# Patient Record
Sex: FEMALE | Race: WHITE | Employment: PART TIME | URBAN - NONMETROPOLITAN AREA
[De-identification: names, ages, dates, MRNs, and addresses within clinical notes are randomized per-mention and may not be internally consistent; named-entity substitution may affect disease eponyms.]

---
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
## 2022-02-16 ENCOUNTER — HOSPITAL ENCOUNTER (OUTPATIENT)
Age: 22
Discharge: HOME | End: 2022-02-16
Payer: COMMERCIAL

## 2022-02-16 ENCOUNTER — HOSPITAL ENCOUNTER (OUTPATIENT)
Dept: GENERAL RADIOLOGY | Age: 22
Discharge: HOME | End: 2022-02-16
Payer: COMMERCIAL

## 2022-02-16 DIAGNOSIS — R07.9 CHEST PAIN, UNSPECIFIED TYPE: ICD-10-CM

## 2022-02-16 PROCEDURE — 71046 X-RAY EXAM CHEST 2 VIEWS: CPT

## 2022-06-07 ENCOUNTER — HOSPITAL ENCOUNTER (OUTPATIENT)
Age: 22
Discharge: HOME | End: 2022-06-07

## 2022-06-22 ENCOUNTER — HOSPITAL ENCOUNTER (EMERGENCY)
Age: 22
Discharge: HOME | End: 2022-06-22
Attending: EMERGENCY MEDICINE
Payer: COMMERCIAL

## 2022-06-22 ENCOUNTER — APPOINTMENT (OUTPATIENT)
Dept: GENERAL RADIOLOGY | Age: 22
End: 2022-06-22
Payer: COMMERCIAL

## 2022-06-22 VITALS
WEIGHT: 131 LBS | BODY MASS INDEX: 22.36 KG/M2 | OXYGEN SATURATION: 99 % | HEIGHT: 64 IN | DIASTOLIC BLOOD PRESSURE: 83 MMHG | TEMPERATURE: 98.3 F | RESPIRATION RATE: 12 BRPM | SYSTOLIC BLOOD PRESSURE: 129 MMHG | HEART RATE: 71 BPM

## 2022-06-22 DIAGNOSIS — R07.9 ACUTE CHEST PAIN: Primary | ICD-10-CM

## 2022-06-22 LAB
ALBUMIN SERPL-MCNC: 4.6 G/DL (ref 3.5–5.1)
ALP BLD-CCNC: 103 U/L (ref 38–126)
ALT SERPL-CCNC: 7 U/L (ref 11–66)
ANION GAP SERPL CALCULATED.3IONS-SCNC: 12 MEQ/L (ref 8–16)
AST SERPL-CCNC: 16 U/L (ref 5–40)
BASOPHILS # BLD: 0.5 %
BASOPHILS ABSOLUTE: 0 THOU/MM3 (ref 0–0.1)
BILIRUB SERPL-MCNC: 0.3 MG/DL (ref 0.3–1.2)
BUN BLDV-MCNC: 11 MG/DL (ref 7–22)
CALCIUM SERPL-MCNC: 9.7 MG/DL (ref 8.5–10.5)
CHLORIDE BLD-SCNC: 106 MEQ/L (ref 98–111)
CO2: 23 MEQ/L (ref 23–33)
CREAT SERPL-MCNC: 0.6 MG/DL (ref 0.4–1.2)
D-DIMER QUANTITATIVE: 403 NG/ML FEU (ref 0–500)
EKG ATRIAL RATE: 84 BPM
EKG P AXIS: 66 DEGREES
EKG P-R INTERVAL: 124 MS
EKG Q-T INTERVAL: 360 MS
EKG QRS DURATION: 68 MS
EKG QTC CALCULATION (BAZETT): 425 MS
EKG R AXIS: 68 DEGREES
EKG T AXIS: 34 DEGREES
EKG VENTRICULAR RATE: 84 BPM
EOSINOPHIL # BLD: 2.7 %
EOSINOPHILS ABSOLUTE: 0.2 THOU/MM3 (ref 0–0.4)
ERYTHROCYTE [DISTWIDTH] IN BLOOD BY AUTOMATED COUNT: 11.9 % (ref 11.5–14.5)
ERYTHROCYTE [DISTWIDTH] IN BLOOD BY AUTOMATED COUNT: 38 FL (ref 35–45)
GLUCOSE BLD-MCNC: 90 MG/DL (ref 70–108)
HCT VFR BLD CALC: 38.6 % (ref 37–47)
HEMOGLOBIN: 13.2 GM/DL (ref 12–16)
IMMATURE GRANS (ABS): 0.02 THOU/MM3 (ref 0–0.07)
IMMATURE GRANULOCYTES: 0.3 %
LYMPHOCYTES # BLD: 27 %
LYMPHOCYTES ABSOLUTE: 1.6 THOU/MM3 (ref 1–4.8)
MCH RBC QN AUTO: 30.3 PG (ref 26–33)
MCHC RBC AUTO-ENTMCNC: 34.2 GM/DL (ref 32.2–35.5)
MCV RBC AUTO: 88.5 FL (ref 81–99)
MONOCYTES # BLD: 8.5 %
MONOCYTES ABSOLUTE: 0.5 THOU/MM3 (ref 0.4–1.3)
NUCLEATED RED BLOOD CELLS: 0 /100 WBC
OSMOLALITY CALCULATION: 280.2 MOSMOL/KG (ref 275–300)
PLATELET # BLD: 135 THOU/MM3 (ref 130–400)
PMV BLD AUTO: 12.9 FL (ref 9.4–12.4)
POTASSIUM REFLEX MAGNESIUM: 4.4 MEQ/L (ref 3.5–5.2)
PREGNANCY, SERUM: NEGATIVE
RBC # BLD: 4.36 MILL/MM3 (ref 4.2–5.4)
SEG NEUTROPHILS: 61 %
SEGMENTED NEUTROPHILS ABSOLUTE COUNT: 3.7 THOU/MM3 (ref 1.8–7.7)
SODIUM BLD-SCNC: 141 MEQ/L (ref 135–145)
TOTAL PROTEIN: 6.8 G/DL (ref 6.1–8)
TROPONIN T: < 0.01 NG/ML
WBC # BLD: 6 THOU/MM3 (ref 4.8–10.8)

## 2022-06-22 PROCEDURE — 84703 CHORIONIC GONADOTROPIN ASSAY: CPT

## 2022-06-22 PROCEDURE — 85379 FIBRIN DEGRADATION QUANT: CPT

## 2022-06-22 PROCEDURE — 36415 COLL VENOUS BLD VENIPUNCTURE: CPT

## 2022-06-22 PROCEDURE — 84484 ASSAY OF TROPONIN QUANT: CPT

## 2022-06-22 PROCEDURE — 6370000000 HC RX 637 (ALT 250 FOR IP)

## 2022-06-22 PROCEDURE — 85025 COMPLETE CBC W/AUTO DIFF WBC: CPT

## 2022-06-22 PROCEDURE — 99215 OFFICE O/P EST HI 40 MIN: CPT

## 2022-06-22 PROCEDURE — 99203 OFFICE O/P NEW LOW 30 MIN: CPT | Performed by: EMERGENCY MEDICINE

## 2022-06-22 PROCEDURE — 71045 X-RAY EXAM CHEST 1 VIEW: CPT

## 2022-06-22 PROCEDURE — 93010 ELECTROCARDIOGRAM REPORT: CPT | Performed by: NUCLEAR MEDICINE

## 2022-06-22 PROCEDURE — 80053 COMPREHEN METABOLIC PANEL: CPT

## 2022-06-22 PROCEDURE — 93005 ELECTROCARDIOGRAM TRACING: CPT | Performed by: EMERGENCY MEDICINE

## 2022-06-22 PROCEDURE — 99284 EMERGENCY DEPT VISIT MOD MDM: CPT

## 2022-06-22 RX ORDER — ASPIRIN 81 MG/1
324 TABLET, CHEWABLE ORAL ONCE
Status: COMPLETED | OUTPATIENT
Start: 2022-06-22 | End: 2022-06-22

## 2022-06-22 RX ORDER — ASPIRIN 81 MG/1
TABLET, CHEWABLE ORAL
Status: DISCONTINUED
Start: 2022-06-22 | End: 2022-06-22

## 2022-06-22 RX ADMIN — ASPIRIN 324 MG: 81 TABLET, CHEWABLE ORAL at 13:09

## 2022-06-22 RX ADMIN — ASPIRIN 81 MG 324 MG: 81 TABLET ORAL at 13:09

## 2022-06-22 ASSESSMENT — ENCOUNTER SYMPTOMS
EYE PAIN: 0
ABDOMINAL PAIN: 0
SHORTNESS OF BREATH: 1
NAUSEA: 0
CONSTIPATION: 0
SINUS PRESSURE: 0
EYE DISCHARGE: 0
CHOKING: 0
ROS SKIN COMMENTS: NO RASH OR BRUISING
SORE THROAT: 0
VOICE CHANGE: 0
STRIDOR: 0
WHEEZING: 0
TROUBLE SWALLOWING: 0
DIARRHEA: 0
VOMITING: 0
BACK PAIN: 0
FACIAL SWELLING: 0
EYE REDNESS: 0
BLOOD IN STOOL: 0
COUGH: 0

## 2022-06-22 ASSESSMENT — PAIN SCALES - GENERAL
PAINLEVEL_OUTOF10: 4
PAINLEVEL_OUTOF10: 5
PAINLEVEL_OUTOF10: 4
PAINLEVEL_OUTOF10: 4

## 2022-06-22 ASSESSMENT — PAIN DESCRIPTION - LOCATION
LOCATION: CHEST

## 2022-06-22 ASSESSMENT — PAIN - FUNCTIONAL ASSESSMENT
PAIN_FUNCTIONAL_ASSESSMENT: 0-10
PAIN_FUNCTIONAL_ASSESSMENT: PREVENTS OR INTERFERES SOME ACTIVE ACTIVITIES AND ADLS
PAIN_FUNCTIONAL_ASSESSMENT: NONE - DENIES PAIN

## 2022-06-22 ASSESSMENT — PAIN DESCRIPTION - DESCRIPTORS: DESCRIPTORS: ACHING

## 2022-06-22 ASSESSMENT — PAIN DESCRIPTION - FREQUENCY: FREQUENCY: CONTINUOUS

## 2022-06-22 ASSESSMENT — PAIN DESCRIPTION - ORIENTATION: ORIENTATION: MID;UPPER

## 2022-06-22 ASSESSMENT — HEART SCORE: ECG: 0

## 2022-06-22 NOTE — ED NOTES
Patient resting in bed. Family at bedside. Respirations easy and unlabored. No distress noted. Call light within reach.        Ginger Mcgowan RN  06/22/22 3798

## 2022-06-22 NOTE — Clinical Note
Junaid Monroe was seen and treated in our emergency department on 6/22/2022. She may return to work on 06/23/2022. If you have any questions or concerns, please don't hesitate to call.       Fernando Carlson, DO

## 2022-06-22 NOTE — ED PROVIDER NOTES
[address] [facility] [address] [address] EMERGENCY DEPT  [facility]       Chief Complaint   Patient presents with    Chest Pain     shortness of breath       Nurses Notes reviewed and I agree except as noted in the HPI. HISTORY OF PRESENT ILLNESS   Jaycee Schofield is a 25 y.o. female who presents with 24-hour history of substernal chest discomfort that she rates at 5 out of 10 in severity, associated with shortness of breath and dizziness. No cough, fever, vomiting, abdominal pain, dizziness, syncope, diaphoresis, rash,  symptoms. No history of heart disease, asthma, DVT/PE. Non-smoker  No possibility of pregnancy  REVIEW OF SYSTEMS     Review of Systems   Constitutional: Negative for appetite change, chills, fatigue, fever and unexpected weight change. Normal appetite no fever   HENT: Negative for congestion, ear discharge, ear pain, facial swelling, hearing loss, nosebleeds, postnasal drip, sinus pressure, sore throat, trouble swallowing and voice change. No upper respiratory symptoms   Eyes: Negative for pain, discharge, redness and visual disturbance. No redness or drainage   Respiratory: Positive for shortness of breath. Negative for cough, choking, wheezing and stridor. Shortness of breath, no cough   Cardiovascular: Positive for chest pain. Negative for leg swelling. Substernal chest pain no syncope   Gastrointestinal: Negative for abdominal pain, blood in stool, constipation, diarrhea, nausea and vomiting. No abdominal pain or vomit   Genitourinary: Negative for dysuria, flank pain, frequency, hematuria, urgency, vaginal bleeding and vaginal discharge. No possibility of pregnancy   Musculoskeletal: Negative for arthralgias, back pain, neck pain and neck stiffness. No leg pain or swelling   Skin: Negative for rash. No rash or bruising   Neurological: Positive for dizziness.  Negative for seizures, syncope, weakness, light-headedness and headaches. No headache or lethargy   Hematological: Negative for adenopathy. Does not bruise/bleed easily. Psychiatric/Behavioral: Negative for confusion, sleep disturbance and suicidal ideas. The patient is not nervous/anxious. red and bold elements reviewed    PAST MEDICAL HISTORY   History reviewed. No pertinent past medical history. SURGICAL HISTORY     Patient  has no past surgical history on file. CURRENT MEDICATIONS       Previous Medications    ASPIRIN-ACETAMINOPHEN-CAFFEINE (EXCEDRIN MIGRAINE) 250-250-65 MG PER TABLET    Take 2 tablets by mouth every 6 hours as needed for Headaches       ALLERGIES     Patient is has No Known Allergies. FAMILY HISTORY     Patient'sfamily history includes Asthma in her father; Heart Disease in her mother; High Blood Pressure in her maternal grandfather and maternal grandmother; Other in her father and paternal grandfather. SOCIAL HISTORY     Patient  reports that she has never smoked. She has never used smokeless tobacco. She reports current alcohol use. She reports that she does not use drugs. PHYSICAL EXAM     ED TRIAGE VITALS  BP: 123/78, Temp: 98.3 °F (36.8 °C), Heart Rate: 92, Resp: 18, SpO2: 98 %  Physical Exam  Vitals and nursing note reviewed. Constitutional:       General: She is not in acute distress. Appearance: She is well-developed. She is not ill-appearing. Comments: Moist membranes   HENT:      Head: Normocephalic and atraumatic. Right Ear: External ear normal.      Left Ear: External ear normal.      Nose: Nose normal.      Mouth/Throat:      Pharynx: No oropharyngeal exudate. Comments: Oropharynx normal  Eyes:      General: No scleral icterus. Right eye: No discharge. Left eye: No discharge. Extraocular Movements:      Right eye: Normal extraocular motion. Left eye: Normal extraocular motion.       Conjunctiva/sclera: Conjunctivae normal.      Pupils: Pupils are equal, round, and reactive to light. Comments: Conjunctiva clear   Neck:      Thyroid: No thyromegaly. Vascular: No JVD. Comments: No meningismus  Cardiovascular:      Rate and Rhythm: Normal rate and regular rhythm. Pulses: Normal pulses. Heart sounds: Normal heart sounds, S1 normal and S2 normal. No murmur heard. No friction rub. No gallop. Comments: Normal sinus rhythm no murmur  Pulmonary:      Effort: Pulmonary effort is normal. No tachypnea or respiratory distress. Breath sounds: Normal breath sounds. No stridor. No decreased breath sounds, wheezing, rhonchi or rales. Comments: No cough lungs clear  Chest:      Chest wall: No tenderness. Abdominal:      General: Bowel sounds are normal. There is no distension. Palpations: Abdomen is soft. There is no mass. Tenderness: There is no abdominal tenderness. There is no guarding or rebound. Musculoskeletal:         General: No tenderness. Normal range of motion. Cervical back: Normal range of motion. Comments: Extremities normal   Lymphadenopathy:      Cervical: No cervical adenopathy. Right cervical: No superficial cervical adenopathy. Left cervical: No superficial cervical adenopathy. Skin:     General: Skin is warm and dry. Findings: No erythema or rash. Comments: No rash or bruising on examined areas   Neurological:      Mental Status: She is alert and oriented to person, place, and time. Cranial Nerves: No cranial nerve deficit. Motor: No abnormal muscle tone. Coordination: Coordination normal.      Deep Tendon Reflexes: Reflexes are normal and symmetric. Reflexes normal.      Comments: Appropriate no focal finding   Psychiatric:         Behavior: Behavior normal.         Thought Content: Thought content normal.         Judgment: Judgment normal.         DIAGNOSTIC RESULTS   Labs: No results found for this visit on 06/22/22.   EKG- normal sinus rhythm, no myocardial ischemia or infarction per my interpretation                     IMAGING:  No orders to display     URGENT CARE COURSE:     Vitals:    06/22/22 1305   BP: 123/78   Pulse: 92   Resp: 18   Temp: 98.3 °F (36.8 °C)   TempSrc: Temporal   SpO2: 98%   Weight: 131 lb (59.4 kg)       Medications   aspirin chewable tablet 324 mg (324 mg Oral Given 6/22/22 1309)   Tolerated well  PROCEDURES:  None  FINALIMPRESSION      1. Acute chest pain    2. Shortness of breath        DISPOSITION/PLAN   DISPOSITION Decision To Transfer 06/22/2022 01:14:30 PM  Patient transferred to [facility] ED per her request.  She is stable for private vehicle transfer with friend to drive.   Patient accepted in transfer by Sterling Siegel [facility] ED charge nurse at [address]:  to [facility] ED      to [facility] ED    DISCHARGE MEDICATIONS:  New Prescriptions    No medications on file     Current Discharge Medication List          MD Tawny Anthony MD  06/22/22 6947

## 2022-06-22 NOTE — ED TRIAGE NOTES
Patient to room from registration. C/o chest pain and shortness of breath beginning 24 hours ago. EKG completed. Provider notified.

## 2022-06-22 NOTE — Clinical Note
Francoise Ashley was seen and treated in our emergency department on 6/22/2022. She may return to work on 06/23/2022. If you have any questions or concerns, please don't hesitate to call.       [facility], DO

## 2022-06-22 NOTE — ED NOTES
Patient released in stable condition. Instructed to go directly to [facility] emergency department for further evaluation and treatment. Instructed to remain NPO until seen by emergency room provider. Patient verbalized understanding. Ambulated, per self, to private car.       [address] [facility], RN  06/22/22 9779

## 2022-06-22 NOTE — ED NOTES
Patient resting in bed. Respirations easy and unlabored. No distress noted. Call light within reach.        Gale Perez RN  06/22/22 [id number]

## 2022-06-22 NOTE — ED PROVIDER NOTES
[facility] [address] COMPLAINT       Chief Complaint   Patient presents with    Chest Pain       Nurses Notes reviewed and I agree except as noted in the HPI. HISTORY OF PRESENT ILLNESS    Diamante Nguyen is a 25 y.o. female. Patient was initially seen at the urgent care and sent over with chest pain beginning earlier in the day in the center of the chest without radiation. Has not had vomiting or sweating. Has not reportedly had similar symptoms in the past.  Patient came with EKG from the urgent care showing sinus rhythm at rate of 84  with no ischemic changes. REVIEW OF SYSTEMS         No fever, no coughing, no extremity pain    Remainder of review of systems is otherwise reviewed as negative. PAST MEDICAL HISTORY    has no past medical history on file. SURGICAL HISTORY      has no past surgical history on file. CURRENT MEDICATIONS       Previous Medications    ASPIRIN-ACETAMINOPHEN-CAFFEINE (EXCEDRIN MIGRAINE) 250-250-65 MG PER TABLET    Take 2 tablets by mouth every 6 hours as needed for Headaches       ALLERGIES     has No Known Allergies. FAMILY HISTORY     She indicated that her mother is alive. She indicated that her father is . She indicated that her maternal grandmother is alive. She indicated that her maternal grandfather is alive. She indicated that her paternal grandmother is alive. She indicated that her paternal grandfather is . family history includes Asthma in her father; Heart Disease in her mother; High Blood Pressure in her maternal grandfather and maternal grandmother; Other in her father and paternal grandfather. SOCIAL HISTORY      reports that she has never smoked. She has never used smokeless tobacco. She reports current alcohol use. She reports that she does not use drugs. PHYSICAL EXAM     INITIAL VITALS:  height is 5' 4\" (1.626 m) and weight is 131 lb (59.4 kg).  Her temporal temperature is 98.3 °F (36.8 °C). Her blood pressure is 129/83 and her pulse is 71. Her respiration is 12 and oxygen saturation is 99%. Constitutional: Well appearing and non-toxic   Eyes:  Pupils are equal and reactive, extraocular muscles intact   HENT:  Atraumatic appearing  oropharynx moist, no pharyngeal exudates. Neck- normal range of motion,  supple   Respiratory:  No wheezing, rhonchi or rales  Cardiovascular: regular  GI:  Non tender, no rigidity, rebound or guarding  Musculoskeletal:  2/4 distal pulses, no pitting edema  Integument: warm and dry  Neurologic:  Alert & oriented x 3  Psychiatric:  Speech and behavior appropriate      DIAGNOSTIC RESULTS     EKG: All EKG's are interpreted by the Emergency Department Physician who either signs or Co-signs this chart in the absence of a cardiologist.  EKG was obtained at the urgent care and is in our system. Showing sinus rhythm at a rate of 84, QRS of 68, QTc of 4-25, axis of 68 with no ST elevation or depression seen. This is a normal-appearing EKG.     RADIOLOGY: non-plain film images(s) such as CT, Ultrasound and MRI are read by the radiologist.  Chest x-ray was interpreted by the radiologist as negative    LABS:   Labs Reviewed   CBC WITH AUTO DIFFERENTIAL - Abnormal; Notable for the following components:       Result Value    MPV 12.9 (*)     All other components within normal limits   COMPREHENSIVE METABOLIC PANEL W/ REFLEX TO MG FOR LOW K - Abnormal; Notable for the following components:    ALT 7 (*)     All other components within normal limits   D-DIMER, QUANTITATIVE   HCG, SERUM, QUALITATIVE   TROPONIN   ANION GAP   OSMOLALITY       EMERGENCY DEPARTMENT COURSE:   Vitals:    Vitals:    06/22/22 1305 06/22/22 1338 06/22/22 1458 06/22/22 1622   BP: 123/78 135/82 122/72 129/83   Pulse: 92 99 70 71   Resp: 18 17 15 12   Temp: 98.3 °F (36.8 °C)      TempSrc: Temporal      SpO2: 98% 99% 99% 99%   Weight: 131 lb (59.4 kg) 131 lb (59.4 kg)     Height: 5' 4\" (1.626 m)       Patient is ruled out for pulmonary embolism by negative D-dimer. She is at low risk. Patient has no history of hypertension hyperlipidemia or diabetes, does not have any strong family history of coronary artery disease. Therefore has a low heart score. There is no evidence of pneumothorax or pneumonia on the chest x-ray. No obvious catastrophic process. Neg HCG. She is advised to follow-up in 3 to 5 days. Heart Score     Score of 0    Heart Score for chest pain patients  History: Slightly Suspicious  ECG: Normal  Patient Age: < 45 years  Risk Factors: No risk factors known    HEART Score recommendations:  Score 0 - 3:   <1.7%  risk of MACE over next 6 wks = Outpatient workup  Score 4 - 6: 12-16% risk of MACE over next 6 wks = Admission for observation  Score 7- 10: 50-65% risk of MACE over next 6 wks = Early invasive strategy    MACE: Major Acute Cardiac Event (All Cause Mortality, AMI or revascularization)  Chest Pain in the Emergency Room: A Multicenter Validation of the [address] Joaquin BE, José AJ, Arsen NADEEM, Edie TP, [employer], Edie EG, Elvia SH, [facility]. Crit Pathw Cardiol. 2010 Sep; 9(3): 164-169. \"A prospective validation of the HEART score for chest pain patients at the emergency department. \" Int J Cardiol. 2013 Oct 3;168(3):2153-8. doi: 10.1016/j.ijcard. 2013.01.255. Epub 2013 Mar 7. CRITICAL CARE:   none         FINAL IMPRESSION      1.  Acute chest pain          DISPOSITION/PLAN   discharged    DISCHARGE MEDICATIONS:  New Prescriptions    No medications on file       (Please note that portions of this note were completed with a voice recognition program.  Efforts were made to edit the dictations but occasionally words are mis-transcribed.)    [facility], [address], DO  06/22/22 6596

## 2022-11-11 ENCOUNTER — HOSPITAL ENCOUNTER (OUTPATIENT)
Age: 22
Discharge: HOME | End: 2022-11-11
Payer: COMMERCIAL

## 2022-11-11 LAB
ALBUMIN SERPL-MCNC: 5 G/DL (ref 3.5–5.1)
ALP BLD-CCNC: 90 U/L (ref 38–126)
ALT SERPL-CCNC: 28 U/L (ref 11–66)
ANION GAP SERPL CALCULATED.3IONS-SCNC: 14 MEQ/L (ref 8–16)
AST SERPL-CCNC: 28 U/L (ref 5–40)
BASOPHILS # BLD: 0.4 %
BASOPHILS ABSOLUTE: 0 THOU/MM3 (ref 0–0.1)
BILIRUB SERPL-MCNC: 0.5 MG/DL (ref 0.3–1.2)
BUN BLDV-MCNC: 11 MG/DL (ref 7–22)
CALCIUM SERPL-MCNC: 10 MG/DL (ref 8.5–10.5)
CHLORIDE BLD-SCNC: 105 MEQ/L (ref 98–111)
CO2: 23 MEQ/L (ref 23–33)
CREAT SERPL-MCNC: 0.7 MG/DL (ref 0.4–1.2)
EKG ATRIAL RATE: 65 BPM
EKG P AXIS: 66 DEGREES
EKG P-R INTERVAL: 126 MS
EKG Q-T INTERVAL: 390 MS
EKG QRS DURATION: 68 MS
EKG QTC CALCULATION (BAZETT): 405 MS
EKG R AXIS: 68 DEGREES
EKG T AXIS: 43 DEGREES
EKG VENTRICULAR RATE: 65 BPM
EOSINOPHIL # BLD: 2.6 %
EOSINOPHILS ABSOLUTE: 0.1 THOU/MM3 (ref 0–0.4)
ERYTHROCYTE [DISTWIDTH] IN BLOOD BY AUTOMATED COUNT: 12.4 % (ref 11.5–14.5)
ERYTHROCYTE [DISTWIDTH] IN BLOOD BY AUTOMATED COUNT: 40.1 FL (ref 35–45)
GFR SERPL CREATININE-BSD FRML MDRD: > 60 ML/MIN/1.73M2
GLUCOSE BLD-MCNC: 95 MG/DL (ref 70–108)
HCT VFR BLD CALC: 40.8 % (ref 37–47)
HEMOGLOBIN: 13.9 GM/DL (ref 12–16)
IMMATURE GRANS (ABS): 0.01 THOU/MM3 (ref 0–0.07)
IMMATURE GRANULOCYTES: 0.2 %
LYMPHOCYTES # BLD: 34.4 %
LYMPHOCYTES ABSOLUTE: 1.6 THOU/MM3 (ref 1–4.8)
MCH RBC QN AUTO: 30.3 PG (ref 26–33)
MCHC RBC AUTO-ENTMCNC: 34.1 GM/DL (ref 32.2–35.5)
MCV RBC AUTO: 88.9 FL (ref 81–99)
MONOCYTES # BLD: 6.5 %
MONOCYTES ABSOLUTE: 0.3 THOU/MM3 (ref 0.4–1.3)
NUCLEATED RED BLOOD CELLS: 0 /100 WBC
PLATELET # BLD: 138 THOU/MM3 (ref 130–400)
PMV BLD AUTO: 12.8 FL (ref 9.4–12.4)
POTASSIUM SERPL-SCNC: 4.4 MEQ/L (ref 3.5–5.2)
RBC # BLD: 4.59 MILL/MM3 (ref 4.2–5.4)
RHEUMATOID FACTOR: < 10 IU/ML (ref 0–13)
SEG NEUTROPHILS: 55.9 %
SEGMENTED NEUTROPHILS ABSOLUTE COUNT: 2.6 THOU/MM3 (ref 1.8–7.7)
SODIUM BLD-SCNC: 142 MEQ/L (ref 135–145)
T4 FREE: 1.09 NG/DL (ref 0.93–1.76)
TOTAL PROTEIN: 7.5 G/DL (ref 6.1–8)
TSH SERPL DL<=0.05 MIU/L-ACNC: 0.89 UIU/ML (ref 0.4–4.2)
WBC # BLD: 4.7 THOU/MM3 (ref 4.8–10.8)

## 2022-11-11 PROCEDURE — 80053 COMPREHEN METABOLIC PANEL: CPT

## 2022-11-11 PROCEDURE — 84443 ASSAY THYROID STIM HORMONE: CPT

## 2022-11-11 PROCEDURE — 93005 ELECTROCARDIOGRAM TRACING: CPT | Performed by: PEDIATRICS

## 2022-11-11 PROCEDURE — 86430 RHEUMATOID FACTOR TEST QUAL: CPT

## 2022-11-11 PROCEDURE — 85025 COMPLETE CBC W/AUTO DIFF WBC: CPT

## 2022-11-11 PROCEDURE — 93010 ELECTROCARDIOGRAM REPORT: CPT | Performed by: NUCLEAR MEDICINE

## 2022-11-11 PROCEDURE — 36415 COLL VENOUS BLD VENIPUNCTURE: CPT

## 2022-11-11 PROCEDURE — 84439 ASSAY OF FREE THYROXINE: CPT

## 2022-11-28 ENCOUNTER — TELEPHONE (OUTPATIENT)
Dept: CARDIOLOGY CLINIC | Age: 22
End: 2022-11-28

## 2022-12-05 ENCOUNTER — OFFICE VISIT (OUTPATIENT)
Dept: CARDIOLOGY CLINIC | Age: 22
End: 2022-12-05
Payer: COMMERCIAL

## 2022-12-05 VITALS
HEART RATE: 88 BPM | HEIGHT: 64 IN | SYSTOLIC BLOOD PRESSURE: 138 MMHG | DIASTOLIC BLOOD PRESSURE: 72 MMHG | BODY MASS INDEX: 22.5 KG/M2 | WEIGHT: 131.8 LBS

## 2022-12-05 DIAGNOSIS — R42 DIZZINESS: Primary | ICD-10-CM

## 2022-12-05 DIAGNOSIS — R55 SYNCOPE, UNSPECIFIED SYNCOPE TYPE: ICD-10-CM

## 2022-12-05 PROCEDURE — 99204 OFFICE O/P NEW MOD 45 MIN: CPT | Performed by: NUCLEAR MEDICINE

## 2022-12-05 RX ORDER — SERTRALINE HYDROCHLORIDE 100 MG/1
TABLET, FILM COATED ORAL
COMMUNITY
Start: 2022-11-10

## 2022-12-05 RX ORDER — MEDROXYPROGESTERONE ACETATE 150 MG/ML
150 INJECTION, SUSPENSION INTRAMUSCULAR
COMMUNITY

## 2022-12-05 ASSESSMENT — ENCOUNTER SYMPTOMS
CONSTIPATION: 0
CHEST TIGHTNESS: 0
NAUSEA: 0
BACK PAIN: 0
ANAL BLEEDING: 0
SHORTNESS OF BREATH: 0
DIARRHEA: 0
RECTAL PAIN: 0
ABDOMINAL PAIN: 0
ABDOMINAL DISTENTION: 0
COLOR CHANGE: 0
VOMITING: 0
BLOOD IN STOOL: 0

## 2022-12-05 NOTE — PROGRESS NOTES
New patient appointment. Patient complains of episodes of almost passing out. Patient complains of dizziness, lightheaded, blurred vision and weakness.

## 2022-12-05 NOTE — PROGRESS NOTES
[address] [place] [address]  [place] [address]  Dept: [phone]  Dept Fax: [phone]  Loc: [phone]    Visit Date: 12/5/2022    Cayden García is a 23 y.o. female who presents todayfor:  Chief Complaint   Patient presents with    New Patient    Establish Cardiologist    Dizziness     Here for the first time  Intermittent dizziness  Near syncope  Different positions  Could be laying but less often   Usually lasts several minutes  No complete syncope  Healthy other wise  Some palpitation   No arrhythmia  No known health issues  No drugs   Low caffeine  No smoking  Family history of CAD     HPI:  Dizziness  Pertinent negatives include no abdominal pain, arthralgias, chest pain, fatigue, joint swelling, myalgias, nausea, neck pain, rash or vomiting. History reviewed. No pertinent past medical history. No past surgical history on file. Family History   Problem Relation Age of Onset    Heart Disease Mother         a fib    Asthma Father     Other Father         MVA    High Blood Pressure Maternal Grandmother     High Blood Pressure Maternal Grandfather     Other Paternal Grandfather         Hep C     Social History     Tobacco Use    Smoking status: Never    Smokeless tobacco: Never   Substance Use Topics    Alcohol use: Yes     Comment: rarely      Current Outpatient Medications   Medication Sig Dispense Refill    sertraline (ZOLOFT) 100 MG tablet TAKE 1 TABLET BY MOUTH EVERY DAY FOR 30 DAYS      medroxyPROGESTERone (DEPO-PROVERA) 150 MG/ML injection Inject 150 mg into the muscle every 3 months       No current facility-administered medications for this visit.      Allergies   Allergen Reactions    Amoxicillin Rash     Health Maintenance   Topic Date Due    COVID-19 Vaccine (1) Never done    Depression Screen  Never done    HIV screen  Never done    Chlamydia/GC screen  Never done    Hepatitis C screen  Never done    Hepatitis A vaccine (2 of 2 - 2-dose series) 05/12/2022    Flu vaccine (1) Never done    DTaP/Tdap/Td vaccine (5 - Td or Tdap) 08/19/2025    Hib vaccine  Completed    HPV vaccine  Completed    Varicella vaccine  Completed    Meningococcal (ACWY) vaccine  Completed    Pneumococcal 0-64 years Vaccine  Aged Out       Subjective:  Review of Systems   Constitutional:  Negative for fatigue. HENT:  Negative for ear pain and mouth sores. Respiratory:  Negative for chest tightness and shortness of breath. Cardiovascular:  Negative for chest pain and palpitations. Gastrointestinal:  Negative for abdominal distention, abdominal pain, anal bleeding, blood in stool, constipation, diarrhea, nausea, rectal pain and vomiting. Endocrine: Negative for polyphagia. Genitourinary:  Negative for dysuria, hematuria and urgency. Musculoskeletal:  Negative for arthralgias, back pain, gait problem, joint swelling, myalgias, neck pain and neck stiffness. Skin:  Negative for color change, pallor, rash and wound. Allergic/Immunologic: Negative for food allergies. Neurological:  Positive for dizziness and light-headedness. Negative for syncope. Psychiatric/Behavioral:  Negative for behavioral problems, confusion, decreased concentration and dysphoric mood. Objective:  Physical Exam  HENT:      Head: Normocephalic. Right Ear: Tympanic membrane normal.      Nose: Nose normal.      Mouth/Throat:      Mouth: Mucous membranes are moist.   Eyes:      Pupils: Pupils are equal, round, and reactive to light. Cardiovascular:      Rate and Rhythm: Normal rate and regular rhythm. Heart sounds: Murmur heard. No gallop. Pulmonary:      Effort: No respiratory distress. Breath sounds: No stridor. No wheezing, rhonchi or rales. Chest:      Chest wall: No tenderness. Abdominal:      General: There is no distension. Palpations: There is no mass. Tenderness: There is no abdominal tenderness.  There is no right CVA tenderness, left CVA tenderness, guarding or rebound. Hernia: No hernia is present. Musculoskeletal:         General: No swelling, tenderness, deformity or signs of injury. Cervical back: Normal range of motion. Right lower leg: No edema. Left lower leg: No edema. Skin:     Coloration: Skin is not jaundiced or pale. Findings: No bruising, erythema, lesion or rash. Neurological:      Mental Status: She is alert and oriented to person, place, and time. Cranial Nerves: No cranial nerve deficit. Sensory: No sensory deficit. Motor: No weakness. Coordination: Coordination normal.      Gait: Gait normal.      Deep Tendon Reflexes: Reflexes normal.   Psychiatric:         Mood and Affect: Mood normal.         Thought Content: Thought content normal.     /72   Pulse 88   Ht 5' 4\" (1.626 m)   Wt 131 lb 12.8 oz (59.8 kg)   BMI 22.62 kg/m²     Assessment:      Diagnosis Orders   1. Dizziness        Possible BP related ? ?vagal   Cant exclude other causes  Normal ECG     Plan:  No follow-ups on file. Discussed  Tilt table   Holter  Continue risk factor modification and medical management  Thank you for allowing me to participate in the care of your patient. Please don't hesitate to contact me regarding any further issues related to the patient care    Orders Placed:  No orders of the defined types were placed in this encounter. Medications Prescribed:  No orders of the defined types were placed in this encounter. Discussed use, benefit, and side effects of prescribed medications. All patient questions answered. Pt voicedunderstanding. Instructed to continue current medications, diet and exercise. Continue risk factor modification and medical management. Patient agreed with treatment plan. Follow up as directed.     Electronically signedby Raul Stewart MD on 12/5/2022 at 9:09 AM

## 2022-12-16 ENCOUNTER — HOSPITAL ENCOUNTER (OUTPATIENT)
Dept: NON INVASIVE DIAGNOSTICS | Age: 22
Discharge: HOME | End: 2022-12-16
Payer: COMMERCIAL

## 2022-12-16 DIAGNOSIS — R55 SYNCOPE, UNSPECIFIED SYNCOPE TYPE: ICD-10-CM

## 2022-12-16 DIAGNOSIS — R42 DIZZINESS: ICD-10-CM

## 2022-12-16 PROCEDURE — 93225 XTRNL ECG REC<48 HRS REC: CPT

## 2022-12-16 PROCEDURE — 93226 XTRNL ECG REC<48 HR SCAN A/R: CPT

## 2022-12-16 NOTE — PROCEDURES
The skin was prepped and a 24 hour monitor was applied. The patient was instructed on the documentation of symptoms and the purpose of the holter as well as the things to avoid while wearing the holter. The patient was instructed to remove and return the holter on 12/17/2022.   The serial number of the holter that was applied is [id number]

## 2023-01-13 ENCOUNTER — HOSPITAL ENCOUNTER (OUTPATIENT)
Dept: NON INVASIVE DIAGNOSTICS | Age: 23
Discharge: HOME | End: 2023-01-13
Payer: COMMERCIAL

## 2023-01-13 DIAGNOSIS — R42 DIZZINESS: ICD-10-CM

## 2023-01-13 DIAGNOSIS — R55 SYNCOPE, UNSPECIFIED SYNCOPE TYPE: ICD-10-CM

## 2023-01-13 PROCEDURE — 93660 TILT TABLE EVALUATION: CPT | Performed by: NUCLEAR MEDICINE

## 2023-01-14 NOTE — PROCEDURES
[address] [place] [address]                                TILT TABLE TEST    PATIENT NAME: Sarah Alfaro                  :        2000  MED REC NO:   [id number]                           ROOM:  ACCOUNT NO:   [de-identified]                           ADMIT DATE: 2023  PROVIDER:     MARYLU Frank STUDY:  2023    CLINICAL HISTORY AND INDICATION:  This is a 28-year-old patient with  dizziness. TILT TABLE TEST DESCRIPTION AND FINDINGS:  Baseline EKG showed sinus  rhythm. Baseline blood pressure was 109/59. Baseline heart rate was 76  beats per minute. The patient was tilted for a total of 30 minutes. Tilt was associated with on and off symptoms of dizziness, nausea, and  heart rate and blood pressure remained relatively stable. Blood  pressure was ranging between 396 to 446 systolic. Heart rate was 80 to  90 beats per minute. Test was completed successfully. No acute drop. CONCLUSIONS:  1. Tilt table test associated with no acute drop. The patient was  noted to have a low blood pressure throughout. 2.  Symptoms did not always correlate with the blood pressure findings. RECOMMENDATIONS:  At this point, continue aggressive hydration, increase  salt intake, and evaluate for potential other causes of the patient's  symptoms.         Toya Burt M.D.    D: 2023 15:07:33       T: 2023 15:21:42     [email or web address]  Job#: [id number]     Doc#: [id number]    CC:

## 2023-01-17 ENCOUNTER — TELEPHONE (OUTPATIENT)
Dept: CARDIOLOGY CLINIC | Age: 23
End: 2023-01-17

## 2023-02-27 ENCOUNTER — HOSPITAL ENCOUNTER (OUTPATIENT)
Age: 23
Discharge: HOME | End: 2023-02-27

## 2023-02-27 LAB
HBV SURFACE AB SER QL IA: NEGATIVE
RUBV IGG SERPL IA-ACNC: 25 IU/ML

## 2023-03-01 LAB
MEV IGG SER-ACNC: 62.5 AU/ML
MUV IGG TITR SER: 0.85 {TITER}
VZV IGG SER QL IA: 0.72

## 2023-05-03 ENCOUNTER — OFFICE VISIT (OUTPATIENT)
Dept: ENT CLINIC | Age: 23
End: 2023-05-03
Payer: COMMERCIAL

## 2023-05-03 VITALS
HEIGHT: 64 IN | HEART RATE: 73 BPM | WEIGHT: 133.7 LBS | DIASTOLIC BLOOD PRESSURE: 74 MMHG | RESPIRATION RATE: 14 BRPM | SYSTOLIC BLOOD PRESSURE: 114 MMHG | TEMPERATURE: 97.3 F | BODY MASS INDEX: 22.83 KG/M2 | OXYGEN SATURATION: 98 %

## 2023-05-03 DIAGNOSIS — J34.2 NASAL SEPTAL DEVIATION: Primary | ICD-10-CM

## 2023-05-03 DIAGNOSIS — J34.89 NASAL OBSTRUCTION: ICD-10-CM

## 2023-05-03 DIAGNOSIS — J34.3 HYPERTROPHY OF INFERIOR NASAL TURBINATE: ICD-10-CM

## 2023-05-03 DIAGNOSIS — M95.0 NASAL VALVE COLLAPSE: ICD-10-CM

## 2023-05-03 PROCEDURE — 99203 OFFICE O/P NEW LOW 30 MIN: CPT | Performed by: OTOLARYNGOLOGY

## 2023-05-03 NOTE — PROGRESS NOTES
08/24/2020 07:37 PM    CO2 23 11/11/2022 08:52 AM    CO2 23 06/22/2022 02:30 PM    CO2 22 08/24/2020 07:37 PM    BUN 11 11/11/2022 08:52 AM    BUN 11 06/22/2022 02:30 PM    BUN 10 08/24/2020 07:37 PM    CREATININE 0.7 11/11/2022 08:52 AM    CREATININE 0.6 06/22/2022 02:30 PM    CREATININE 0.7 08/24/2020 07:37 PM    CALCIUM 10.0 11/11/2022 08:52 AM    CALCIUM 9.7 06/22/2022 02:30 PM    CALCIUM 9.6 08/24/2020 07:37 PM    PROT 7.5 11/11/2022 08:52 AM    PROT 6.8 06/22/2022 02:30 PM    PROT 7.5 08/24/2020 07:37 PM    LABALBU 5.0 11/11/2022 08:52 AM    LABALBU 4.6 06/22/2022 02:30 PM    LABALBU 4.7 08/24/2020 07:37 PM    BILITOT 0.5 11/11/2022 08:52 AM    BILITOT 0.3 06/22/2022 02:30 PM    BILITOT 0.8 08/24/2020 07:37 PM    ALKPHOS 90 11/11/2022 08:52 AM    ALKPHOS 103 06/22/2022 02:30 PM    ALKPHOS 106 08/24/2020 07:37 PM    AST 28 11/11/2022 08:52 AM    AST 16 06/22/2022 02:30 PM    AST 19 08/24/2020 07:37 PM    ALT 28 11/11/2022 08:52 AM    ALT 7 06/22/2022 02:30 PM    ALT 10 08/24/2020 07:37 PM       All of the past medical history, past surgical history, family history,social history, allergies and current medications were reviewed with the patient. Assessment & Plan   Diagnoses and all orders for this visit:     Diagnosis Orders   1. Nasal septal deviation  CT FACIAL BONES WO CONTRAST      2. Nasal obstruction  CT FACIAL BONES WO CONTRAST      3. Hypertrophy of inferior nasal turbinate  CT FACIAL BONES WO CONTRAST      4. Nasal valve collapse  CT FACIAL BONES WO CONTRAST          The findings were explained and her questions were answered. Based patient's history and these physical findings, the patient has moderate to severe nasal septal deviation with nasal airway obstruction and no history of trauma.   As such I recommended we start with a facial bone CT to look at the deep skeletal structure of the nasal airway with her follow-up visit likely to include nasal endoscopy and nasopharyngoscopy to determine

## 2023-05-23 ENCOUNTER — HOSPITAL ENCOUNTER (OUTPATIENT)
Dept: CT IMAGING | Age: 23
Discharge: HOME | End: 2023-05-23
Payer: COMMERCIAL

## 2023-05-23 DIAGNOSIS — M95.0 NASAL VALVE COLLAPSE: ICD-10-CM

## 2023-05-23 DIAGNOSIS — J34.89 NASAL OBSTRUCTION: ICD-10-CM

## 2023-05-23 DIAGNOSIS — J34.3 HYPERTROPHY OF INFERIOR NASAL TURBINATE: ICD-10-CM

## 2023-05-23 DIAGNOSIS — J34.2 NASAL SEPTAL DEVIATION: ICD-10-CM

## 2023-05-23 PROCEDURE — 70486 CT MAXILLOFACIAL W/O DYE: CPT

## 2023-06-13 ENCOUNTER — TELEPHONE (OUTPATIENT)
Dept: CARDIOLOGY CLINIC | Age: 23
End: 2023-06-13

## 2023-06-13 PROBLEM — M95.0 NASAL VALVE COLLAPSE: Status: ACTIVE | Noted: 2023-06-13

## 2023-06-13 PROBLEM — J34.829 NASAL VALVE COLLAPSE: Status: ACTIVE | Noted: 2023-06-13

## 2023-06-13 PROBLEM — J34.89 NASAL OBSTRUCTION: Status: ACTIVE | Noted: 2023-06-13

## 2023-06-13 PROBLEM — J34.3 HYPERTROPHY OF INFERIOR NASAL TURBINATE: Status: ACTIVE | Noted: 2023-06-13

## 2023-06-13 PROBLEM — J34.2 NASAL SEPTAL DEVIATION: Status: ACTIVE | Noted: 2023-06-13

## 2023-07-18 ENCOUNTER — HOSPITAL ENCOUNTER (OUTPATIENT)
Dept: GENERAL RADIOLOGY | Age: 23
Discharge: HOME | End: 2023-07-18
Payer: COMMERCIAL

## 2023-07-18 ENCOUNTER — HOSPITAL ENCOUNTER (OUTPATIENT)
Age: 23
Discharge: HOME | End: 2023-07-18
Payer: COMMERCIAL

## 2023-07-18 DIAGNOSIS — Z01.818 PRE-OP TESTING: ICD-10-CM

## 2023-07-18 LAB
BASOPHILS ABSOLUTE: 0 THOU/MM3 (ref 0–0.1)
BASOPHILS NFR BLD AUTO: 0.3 %
DEPRECATED RDW RBC AUTO: 38 FL (ref 35–45)
EOSINOPHIL NFR BLD AUTO: 1.2 %
EOSINOPHILS ABSOLUTE: 0.1 THOU/MM3 (ref 0–0.4)
ERYTHROCYTE [DISTWIDTH] IN BLOOD BY AUTOMATED COUNT: 11.9 % (ref 11.5–14.5)
HCT VFR BLD AUTO: 40.8 % (ref 37–47)
HGB BLD-MCNC: 14.2 GM/DL (ref 12–16)
IMM GRANULOCYTES # BLD AUTO: 0.01 THOU/MM3 (ref 0–0.07)
IMM GRANULOCYTES NFR BLD AUTO: 0.1 %
LYMPHOCYTES ABSOLUTE: 2.1 THOU/MM3 (ref 1–4.8)
LYMPHOCYTES NFR BLD AUTO: 30.6 %
MCH RBC QN AUTO: 31.1 PG (ref 26–33)
MCHC RBC AUTO-ENTMCNC: 34.8 GM/DL (ref 32.2–35.5)
MCV RBC AUTO: 89.3 FL (ref 81–99)
MONOCYTES ABSOLUTE: 0.5 THOU/MM3 (ref 0.4–1.3)
MONOCYTES NFR BLD AUTO: 8 %
NEUTROPHILS NFR BLD AUTO: 59.8 %
NRBC BLD AUTO-RTO: 0 /100 WBC
PLATELET # BLD AUTO: 145 THOU/MM3 (ref 130–400)
PMV BLD AUTO: 13 FL (ref 9.4–12.4)
RBC # BLD AUTO: 4.57 MILL/MM3 (ref 4.2–5.4)
SEGMENTED NEUTROPHILS ABSOLUTE COUNT: 4 THOU/MM3 (ref 1.8–7.7)
WBC # BLD AUTO: 6.7 THOU/MM3 (ref 4.8–10.8)

## 2023-07-18 PROCEDURE — 36415 COLL VENOUS BLD VENIPUNCTURE: CPT

## 2023-07-18 PROCEDURE — 71046 X-RAY EXAM CHEST 2 VIEWS: CPT

## 2023-07-18 PROCEDURE — 85025 COMPLETE CBC W/AUTO DIFF WBC: CPT

## 2023-07-28 ENCOUNTER — PREP FOR PROCEDURE (OUTPATIENT)
Dept: ENT CLINIC | Age: 23
End: 2023-07-28

## 2023-08-02 RX ORDER — SODIUM CHLORIDE 0.9 % (FLUSH) 0.9 %
5-40 SYRINGE (ML) INJECTION PRN
Status: CANCELLED | OUTPATIENT
Start: 2023-08-02

## 2023-08-02 RX ORDER — SODIUM CHLORIDE 9 MG/ML
INJECTION, SOLUTION INTRAVENOUS PRN
Status: CANCELLED | OUTPATIENT
Start: 2023-08-02

## 2023-08-02 RX ORDER — SODIUM CHLORIDE 0.9 % (FLUSH) 0.9 %
5-40 SYRINGE (ML) INJECTION EVERY 12 HOURS SCHEDULED
Status: CANCELLED | OUTPATIENT
Start: 2023-08-02

## 2023-08-04 ENCOUNTER — HOSPITAL ENCOUNTER (OUTPATIENT)
Age: 23
Setting detail: OUTPATIENT SURGERY
Discharge: HOME | End: 2023-08-04
Attending: OTOLARYNGOLOGY | Admitting: OTOLARYNGOLOGY
Payer: COMMERCIAL

## 2023-08-04 ENCOUNTER — ANESTHESIA (OUTPATIENT)
Dept: OPERATING ROOM | Age: 23
End: 2023-08-04
Payer: COMMERCIAL

## 2023-08-04 ENCOUNTER — ANESTHESIA EVENT (OUTPATIENT)
Dept: OPERATING ROOM | Age: 23
End: 2023-08-04
Payer: COMMERCIAL

## 2023-08-04 VITALS
RESPIRATION RATE: 12 BRPM | BODY MASS INDEX: 22.16 KG/M2 | HEART RATE: 88 BPM | HEIGHT: 65 IN | WEIGHT: 133 LBS | TEMPERATURE: 97.2 F | OXYGEN SATURATION: 97 % | DIASTOLIC BLOOD PRESSURE: 66 MMHG | SYSTOLIC BLOOD PRESSURE: 118 MMHG

## 2023-08-04 DIAGNOSIS — J34.2 NASAL SEPTAL DEVIATION: ICD-10-CM

## 2023-08-04 DIAGNOSIS — Z98.890 STATUS POST NASAL SEPTOPLASTY: ICD-10-CM

## 2023-08-04 DIAGNOSIS — J34.89 NASAL OBSTRUCTION: ICD-10-CM

## 2023-08-04 DIAGNOSIS — J34.3 HYPERTROPHY OF INFERIOR NASAL TURBINATE: ICD-10-CM

## 2023-08-04 DIAGNOSIS — G89.18 ACUTE POSTOPERATIVE PAIN: Primary | ICD-10-CM

## 2023-08-04 DIAGNOSIS — M95.0 NASAL VALVE COLLAPSE: ICD-10-CM

## 2023-08-04 LAB — PREGNANCY, URINE: NEGATIVE

## 2023-08-04 PROCEDURE — 6360000002 HC RX W HCPCS: Performed by: NURSE ANESTHETIST, CERTIFIED REGISTERED

## 2023-08-04 PROCEDURE — 6360000002 HC RX W HCPCS

## 2023-08-04 PROCEDURE — 3600000004 HC SURGERY LEVEL 4 BASE: Performed by: OTOLARYNGOLOGY

## 2023-08-04 PROCEDURE — 6360000002 HC RX W HCPCS: Performed by: ANESTHESIOLOGY

## 2023-08-04 PROCEDURE — 3700000000 HC ANESTHESIA ATTENDED CARE: Performed by: OTOLARYNGOLOGY

## 2023-08-04 PROCEDURE — 2709999900 HC NON-CHARGEABLE SUPPLY: Performed by: OTOLARYNGOLOGY

## 2023-08-04 PROCEDURE — 2720000010 HC SURG SUPPLY STERILE: Performed by: OTOLARYNGOLOGY

## 2023-08-04 PROCEDURE — 6360000002 HC RX W HCPCS: Performed by: OTOLARYNGOLOGY

## 2023-08-04 PROCEDURE — 2500000003 HC RX 250 WO HCPCS: Performed by: ANESTHESIOLOGY

## 2023-08-04 PROCEDURE — 2500000003 HC RX 250 WO HCPCS: Performed by: NURSE ANESTHETIST, CERTIFIED REGISTERED

## 2023-08-04 PROCEDURE — 2580000003 HC RX 258: Performed by: OTOLARYNGOLOGY

## 2023-08-04 PROCEDURE — 30468 RPR NSL VLV COLLAPSE W/IMPLT: CPT | Performed by: OTOLARYNGOLOGY

## 2023-08-04 PROCEDURE — 6370000000 HC RX 637 (ALT 250 FOR IP): Performed by: OTOLARYNGOLOGY

## 2023-08-04 PROCEDURE — 7100000010 HC PHASE II RECOVERY - FIRST 15 MIN: Performed by: OTOLARYNGOLOGY

## 2023-08-04 PROCEDURE — 88300 SURGICAL PATH GROSS: CPT

## 2023-08-04 PROCEDURE — 7100000000 HC PACU RECOVERY - FIRST 15 MIN: Performed by: OTOLARYNGOLOGY

## 2023-08-04 PROCEDURE — 88305 TISSUE EXAM BY PATHOLOGIST: CPT

## 2023-08-04 PROCEDURE — 81025 URINE PREGNANCY TEST: CPT

## 2023-08-04 PROCEDURE — 30520 REPAIR OF NASAL SEPTUM: CPT | Performed by: OTOLARYNGOLOGY

## 2023-08-04 PROCEDURE — 30130 EXCISE INFERIOR TURBINATE: CPT | Performed by: OTOLARYNGOLOGY

## 2023-08-04 PROCEDURE — 3700000001 HC ADD 15 MINUTES (ANESTHESIA): Performed by: OTOLARYNGOLOGY

## 2023-08-04 PROCEDURE — C9399 UNCLASSIFIED DRUGS OR BIOLOG: HCPCS | Performed by: NURSE ANESTHETIST, CERTIFIED REGISTERED

## 2023-08-04 PROCEDURE — 3600000014 HC SURGERY LEVEL 4 ADDTL 15MIN: Performed by: OTOLARYNGOLOGY

## 2023-08-04 PROCEDURE — 7100000011 HC PHASE II RECOVERY - ADDTL 15 MIN: Performed by: OTOLARYNGOLOGY

## 2023-08-04 PROCEDURE — 7100000001 HC PACU RECOVERY - ADDTL 15 MIN: Performed by: OTOLARYNGOLOGY

## 2023-08-04 RX ORDER — SODIUM CHLORIDE 0.9 % (FLUSH) 0.9 %
5-40 SYRINGE (ML) INJECTION EVERY 12 HOURS SCHEDULED
Status: DISCONTINUED | OUTPATIENT
Start: 2023-08-04 | End: 2023-08-04 | Stop reason: HOSPADM

## 2023-08-04 RX ORDER — OXYCODONE HYDROCHLORIDE AND ACETAMINOPHEN 5; 325 MG/1; MG/1
1 TABLET ORAL EVERY 6 HOURS PRN
Qty: 20 TABLET | Refills: 0 | Status: SHIPPED | OUTPATIENT
Start: 2023-08-04 | End: 2023-08-09

## 2023-08-04 RX ORDER — OXYCODONE HYDROCHLORIDE AND ACETAMINOPHEN 5; 325 MG/1; MG/1
1 TABLET ORAL ONCE
Status: COMPLETED | OUTPATIENT
Start: 2023-08-04 | End: 2023-08-04

## 2023-08-04 RX ORDER — FENTANYL CITRATE 50 UG/ML
INJECTION, SOLUTION INTRAMUSCULAR; INTRAVENOUS PRN
Status: DISCONTINUED | OUTPATIENT
Start: 2023-08-04 | End: 2023-08-04 | Stop reason: SDUPTHER

## 2023-08-04 RX ORDER — PROPOFOL 10 MG/ML
INJECTION, EMULSION INTRAVENOUS PRN
Status: DISCONTINUED | OUTPATIENT
Start: 2023-08-04 | End: 2023-08-04 | Stop reason: SDUPTHER

## 2023-08-04 RX ORDER — SODIUM CHLORIDE 0.9 % (FLUSH) 0.9 %
5-40 SYRINGE (ML) INJECTION PRN
Status: DISCONTINUED | OUTPATIENT
Start: 2023-08-04 | End: 2023-08-04 | Stop reason: HOSPADM

## 2023-08-04 RX ORDER — LIDOCAINE HCL/PF 100 MG/5ML
SYRINGE (ML) INJECTION PRN
Status: DISCONTINUED | OUTPATIENT
Start: 2023-08-04 | End: 2023-08-04 | Stop reason: SDUPTHER

## 2023-08-04 RX ORDER — MIDAZOLAM HYDROCHLORIDE 1 MG/ML
INJECTION INTRAMUSCULAR; INTRAVENOUS PRN
Status: DISCONTINUED | OUTPATIENT
Start: 2023-08-04 | End: 2023-08-04 | Stop reason: SDUPTHER

## 2023-08-04 RX ORDER — DEXAMETHASONE SODIUM PHOSPHATE 4 MG/ML
10 INJECTION, SOLUTION INTRA-ARTICULAR; INTRALESIONAL; INTRAMUSCULAR; INTRAVENOUS; SOFT TISSUE
Status: COMPLETED | OUTPATIENT
Start: 2023-08-04 | End: 2023-08-04

## 2023-08-04 RX ORDER — DROPERIDOL 2.5 MG/ML
INJECTION, SOLUTION INTRAMUSCULAR; INTRAVENOUS
Status: COMPLETED
Start: 2023-08-04 | End: 2023-08-04

## 2023-08-04 RX ORDER — MINERAL OIL, WHITE PETROLATUM .03; .94 G/G; G/G
OINTMENT OPHTHALMIC PRN
Status: DISCONTINUED | OUTPATIENT
Start: 2023-08-04 | End: 2023-08-04 | Stop reason: SDUPTHER

## 2023-08-04 RX ORDER — SULFAMETHOXAZOLE AND TRIMETHOPRIM 800; 160 MG/1; MG/1
1 TABLET ORAL 2 TIMES DAILY
Qty: 20 TABLET | Refills: 0 | Status: SHIPPED | OUTPATIENT
Start: 2023-08-04 | End: 2023-08-14

## 2023-08-04 RX ORDER — FENTANYL CITRATE 50 UG/ML
50 INJECTION, SOLUTION INTRAMUSCULAR; INTRAVENOUS EVERY 5 MIN PRN
Status: DISCONTINUED | OUTPATIENT
Start: 2023-08-04 | End: 2023-08-04 | Stop reason: HOSPADM

## 2023-08-04 RX ORDER — SODIUM CHLORIDE 9 MG/ML
INJECTION, SOLUTION INTRAVENOUS PRN
Status: DISCONTINUED | OUTPATIENT
Start: 2023-08-04 | End: 2023-08-04 | Stop reason: HOSPADM

## 2023-08-04 RX ORDER — MEPERIDINE HYDROCHLORIDE 25 MG/ML
12.5 INJECTION INTRAMUSCULAR; INTRAVENOUS; SUBCUTANEOUS EVERY 5 MIN PRN
Status: DISCONTINUED | OUTPATIENT
Start: 2023-08-04 | End: 2023-08-04 | Stop reason: HOSPADM

## 2023-08-04 RX ORDER — ONDANSETRON 2 MG/ML
INJECTION INTRAMUSCULAR; INTRAVENOUS PRN
Status: DISCONTINUED | OUTPATIENT
Start: 2023-08-04 | End: 2023-08-04 | Stop reason: SDUPTHER

## 2023-08-04 RX ORDER — OXYMETAZOLINE HYDROCHLORIDE 0.05 G/100ML
SPRAY NASAL PRN
Status: DISCONTINUED | OUTPATIENT
Start: 2023-08-04 | End: 2023-08-04 | Stop reason: ALTCHOICE

## 2023-08-04 RX ORDER — ONDANSETRON 2 MG/ML
4 INJECTION INTRAMUSCULAR; INTRAVENOUS
Status: DISCONTINUED | OUTPATIENT
Start: 2023-08-04 | End: 2023-08-04 | Stop reason: HOSPADM

## 2023-08-04 RX ORDER — DROPERIDOL 2.5 MG/ML
0.62 INJECTION, SOLUTION INTRAMUSCULAR; INTRAVENOUS EVERY 10 MIN PRN
Status: COMPLETED | OUTPATIENT
Start: 2023-08-04 | End: 2023-08-04

## 2023-08-04 RX ORDER — EPINEPHRINE IN SOD CHLOR,ISO 1 MG/10 ML
SYRINGE (ML) INTRAVENOUS PRN
Status: DISCONTINUED | OUTPATIENT
Start: 2023-08-04 | End: 2023-08-04 | Stop reason: ALTCHOICE

## 2023-08-04 RX ORDER — ROCURONIUM BROMIDE 10 MG/ML
INJECTION, SOLUTION INTRAVENOUS PRN
Status: DISCONTINUED | OUTPATIENT
Start: 2023-08-04 | End: 2023-08-04 | Stop reason: SDUPTHER

## 2023-08-04 RX ADMIN — DROPERIDOL 0.62 MG: 2.5 INJECTION, SOLUTION INTRAMUSCULAR; INTRAVENOUS at 10:13

## 2023-08-04 RX ADMIN — HYDROMORPHONE HYDROCHLORIDE 0.25 MG: 1 INJECTION, SOLUTION INTRAMUSCULAR; INTRAVENOUS; SUBCUTANEOUS at 10:32

## 2023-08-04 RX ADMIN — FENTANYL CITRATE 50 MCG: 50 INJECTION, SOLUTION INTRAMUSCULAR; INTRAVENOUS at 09:02

## 2023-08-04 RX ADMIN — MINERAL OIL, WHITE PETROLATUM 500 MG: .03; .94 OINTMENT OPHTHALMIC at 07:39

## 2023-08-04 RX ADMIN — PROPOFOL 140 MG: 10 INJECTION, EMULSION INTRAVENOUS at 07:37

## 2023-08-04 RX ADMIN — SUGAMMADEX 150 MG: 100 INJECTION, SOLUTION INTRAVENOUS at 10:00

## 2023-08-04 RX ADMIN — ONDANSETRON 4 MG: 2 INJECTION INTRAMUSCULAR; INTRAVENOUS at 09:53

## 2023-08-04 RX ADMIN — OXYCODONE AND ACETAMINOPHEN 1 TABLET: 5; 325 TABLET ORAL at 11:59

## 2023-08-04 RX ADMIN — ROCURONIUM BROMIDE 40 MG: 10 INJECTION INTRAVENOUS at 07:37

## 2023-08-04 RX ADMIN — FENTANYL CITRATE 50 MCG: 50 INJECTION, SOLUTION INTRAMUSCULAR; INTRAVENOUS at 08:07

## 2023-08-04 RX ADMIN — MEPERIDINE HYDROCHLORIDE 12.5 MG: 25 INJECTION, SOLUTION INTRAMUSCULAR; INTRAVENOUS; SUBCUTANEOUS at 10:24

## 2023-08-04 RX ADMIN — DEXAMETHASONE SODIUM PHOSPHATE 10 MG: 4 INJECTION, SOLUTION INTRA-ARTICULAR; INTRALESIONAL; INTRAMUSCULAR; INTRAVENOUS; SOFT TISSUE at 07:13

## 2023-08-04 RX ADMIN — DROPERIDOL 0.62 MG: 2.5 INJECTION, SOLUTION INTRAMUSCULAR; INTRAVENOUS at 10:23

## 2023-08-04 RX ADMIN — Medication 100 MG: at 07:37

## 2023-08-04 RX ADMIN — HYDROMORPHONE HYDROCHLORIDE 0.25 MG: 1 INJECTION, SOLUTION INTRAMUSCULAR; INTRAVENOUS; SUBCUTANEOUS at 10:27

## 2023-08-04 RX ADMIN — ROCURONIUM BROMIDE 10 MG: 10 INJECTION INTRAVENOUS at 08:52

## 2023-08-04 RX ADMIN — ROCURONIUM BROMIDE 10 MG: 10 INJECTION INTRAVENOUS at 09:25

## 2023-08-04 RX ADMIN — MIDAZOLAM 2 MG: 1 INJECTION INTRAMUSCULAR; INTRAVENOUS at 07:28

## 2023-08-04 RX ADMIN — PIPERACILLIN SODIUM,TAZOBACTAM SODIUM 3375 MG: 3; .375 INJECTION, POWDER, FOR SOLUTION INTRAVENOUS at 07:56

## 2023-08-04 RX ADMIN — SODIUM CHLORIDE: 9 INJECTION, SOLUTION INTRAVENOUS at 07:09

## 2023-08-04 RX ADMIN — FENTANYL CITRATE 50 MCG: 50 INJECTION, SOLUTION INTRAMUSCULAR; INTRAVENOUS at 07:32

## 2023-08-04 ASSESSMENT — PAIN DESCRIPTION - LOCATION
LOCATION: NOSE
LOCATION: FACE;NOSE

## 2023-08-04 ASSESSMENT — PAIN DESCRIPTION - ORIENTATION
ORIENTATION: MID
ORIENTATION: INNER

## 2023-08-04 ASSESSMENT — PAIN DESCRIPTION - DESCRIPTORS
DESCRIPTORS: PRESSURE;SORE
DESCRIPTORS: ACHING;DISCOMFORT

## 2023-08-04 ASSESSMENT — PAIN SCALES - GENERAL
PAINLEVEL_OUTOF10: 6
PAINLEVEL_OUTOF10: 5
PAINLEVEL_OUTOF10: 7
PAINLEVEL_OUTOF10: 6
PAINLEVEL_OUTOF10: 6
PAINLEVEL_OUTOF10: 0

## 2023-08-04 NOTE — Clinical Note
ECT prep: All supplies for ECT procedure collected and ready- EKG patches for ECT monitoring, brown bite block, tongue depressor, 10ml and 50ml syringes, blunt needles. An oral airway, ambu bag close in case of need. Suction and yankauers hooked up and readt for use. Medications needed for ECT procedure pulled from [address]- 100mg Brevital, 50ml Sterile Water, Succinylcholine. ECT patched retrieved from ECT cart. Patches checked to ensure they have not . ECT machine turned on. Time: ***  Patient arrived to PACU. Patient checked in. Anesthesia notified that patient in department. Patient moved to [facility] for procedure and hooked up to vital signs monitor. Thigh blood pressure cuff placed below right knee. Vital Signs:       Blood Pressure- ***       Heart Rate- ***       Pulse Ox- ***       Type of supplemental O2 if any in place- {Supplemental Oxygen:70651}       Respirations- ***       Temperature- ***    Patient socks removed. Verified procedure consent is present, signed by all parties and dated. Anesthesia consent completed at patient arrival.     Anesthesia reminded to inform RN prior to medicating patient to allow for blood pressure cuff to be inflated. Physician performing procedure to place ECT patches. Time: ***  Timeout performed. All staff involved in ECT procedure present. Time out charted under PACU time out section. Procedure started at ***. Procedure completed at ***. Post ECT:   Caryle Colonel bite block removed from patient mouth, placed in bag, and [employer] called to come and get. Patient placed in recovery phase. Time: ***  Recovery of patient completed. Patient transported back to {Department:92752}. IV fluids {IV fluids:40158}.

## 2023-08-04 NOTE — PROGRESS NOTES
Pt has met discharge criteria and states she is ready for discharge to home. IV removed, gauze and tape applied. Dressed in own clothes and personal belongings gathered. Discharge instructions (with opioid medication education information) given to pt and family; pt and family verbalized understanding of discharge instructions, prescriptions and follow up appointments. Pt transported to discharge lobby by Susan Truong [facility] staff.

## 2023-08-04 NOTE — OP NOTE
the pocket with sterile saline and closed the wound now with a significant reduction of the skin and soft tissue on the nostril side with 2 figure-of-eight 4-0 Monocryl undyed sutures on a P3 cutting needle. I then turned my attention to the left nostril which I similarly injected with epinephrine saline and then removed an ovoid portion of tissue and parallel to the nostril crease making a pocket orthogonal to the nostril crease for the placement of a similar portion of septal cartilage which I cut from the pieces of septal cartilage already removed. Once in position I irrigated out the nostril pocket and closed it with 2 figure-of-eight undyed 4-0 Monocryl sutures on a P3 cutting needle. I then closed the hemitransfixion incision with a series of 4 figure-of-eight 4-0 Monocryl sutures on a P3 cutting needle to close the wound completely. I applied a series of 8 titanium septal clips with 2 being oriented vertically along the anterior septum 3 along the floor of the septum in the region of the 2 pseudo turbinate protrusions on the left and right sides, and then another 3 superiorly and posteriorly to hold the septum together in the midline. I irrigated out the nose again and then placed 2 Bactroban coated Hampton splints on either side of the nostril. I attached it to the columella with a 2-0 Prolene suture and FS cutting needle in a horizontal mattress configuration tying it on the left side. I suctioned out the nasal airway in the oropharynx and placed an oral airway in the oral cavity before turning the patient back to anesthesia for reversal and extubation in the operating room. This was carried out without incident and the patient was taken to recovery in satisfactory condition. Estimated blood loss in the case was less than 50 cc and the patient received approximately a liter of intravenous lactated Ringer's intraoperatively. No blood products were transfused.   No intraoperative complications were detected. Counts were considered accurate x3. I performed the patient's entire operation myself. Disposition: The patient will be discharged home after being found stable through the first and second phases of recovery.     Electronically signed by Amarjit Huffman MD on 8/4/2023 at 2:13 PM

## 2023-08-04 NOTE — PROGRESS NOTES
1009- pt to pacu. Vss. Pt appears in no acute distress. Pt reports nausea, denies pain. 1016- pt resting in bed eyes closed. Continues to report nausea. 1023- nausea improved, pain increased, medicated per orders. 1033- pt resting in bed snoring resp. Pt appears in no acute distress. VSS    1046- pt remains in bed eyes clsoed with snoring resp. VSS.      1052- pt meets criteria for discharge from pacu,     1108- pt returned to [facility] report given to [facility]

## 2023-08-04 NOTE — H&P
History and physical    HPI: The patient is a 28-year-old female with a history of lifelong nasal airway obstruction and chronic mouth breathing. She has been evaluated preoperatively to determine that she has structural pathology of the nose verified on nasal examination as well as a CT scan. She comes today to the operating room for surgical treatment of same. She has no new medical problems and is on no new medicines. ROS  Noncontributory except as above    Physical exam:  The patient is pleasant alert cooperative well-appearing young adult female in no acute distress. Her voice and speech pattern are within normal limits with the exception of mild to moderate hyponasality  She is breathing without labor    Impressions:  Chronic structural nasal airway obstruction with nasal valving    Plan:  Nasal septal reconstruction  Bilateral inferior turbinate reduction  And left-sided nasal valve repair with cartilage implant and possible right-sided implant if the 2 sides are equally flaccid. I reviewed this plan with the patient and her mother at the bedside this morning to their satisfaction. They reported understanding the basis of my recommendations and she reported wishing to proceed as recommended. The note attached below is of the patient's most recent office visit for additional information. Mari Douglas. Tanya Meraz MD    Office Visit    6/13/2023  Dav Mejia MD  Otolaryngology Nasal obstruction +3 more  Dx Follow-up   Reason for Visit     Progress Notes  Eric Holder MD (Physician)   Otolaryngology  Expand All Collapse All    [facility], NOSE AND THROAT  [address] [id number]  Dept: [phone]  Dept Fax: [phone]  Loc: [phone]     Jennifer Costa is a 23 y.o. female who was referred by No ref.  provider found for:       Chief Complaint   Patient presents

## 2023-08-04 NOTE — DISCHARGE INSTRUCTIONS
Specific instructions for Vycatracho Hernandez from Dr. Carline Page:    1. Rest; sleep propped up or on an easy chair for the next 3-4 nights until you can tolerate lying flat without extra pain or bloody oozing from your nose  2. Do not blow your nose until your nasal septal splints are removed and you were given permission by me  3. Apply antibiotic ointment to both sides of your nose and particularly well into the side that was incised (usually the more tender of the 2 sides) 3 times a day until the splints are removed  4. Use gauze drip pads under your nostrils to catch any draining blood and change them twice a day and as needed for saturation  5. If you begin to bleed relatively briskly, lean forward and put an ice pack on your forehead and upper nose to relieve cool the bones of the upper face and had. This may bring the bleeding to a halt. 6.  If you begin to bleed briskly or if the bleeding does not respond to an ice pack on your forehead as above, come to the emergency department and be evaluated. If you are still bleeding they will call me or my colleague on-call to help you. Do not swallow the blood but instead allow it to drip into a bowl or the kidney basin you got from the holding room you were in today. For emergencies:  Hawa Santana, [address]  Cell: [phone]

## 2023-08-04 NOTE — ANESTHESIA POSTPROCEDURE EVALUATION
Department of Anesthesiology  Postprocedure Note    Patient: Lynette Morel  MRN: [id number]  YOB: 2000  Date of evaluation: 8/4/2023      Procedure Summary     Date: 08/04/23 Room / Location: [facility] 01 / [address] [place] [address]    Anesthesia Start: 0730 Anesthesia Stop: 1011    Procedure: Septoplasty Bilateral Inferior Turbinate Resection Left Nasal Valve Collapse Repair including Lateral Wall Implant from Septal Cartilage (Nose) Diagnosis:       Nasal septal deviation      Hypertrophy of inferior nasal turbinate      Nasal valve collapse      Nasal obstruction      (Nasal septal deviation [J34.2])      (Hypertrophy of inferior nasal turbinate [J34.3])      (Nasal valve collapse [M95.0])      (Nasal obstruction [J34.89])    Surgeons: Miles Almanzar MD Responsible Provider: Kaitlin Woodall DO    Anesthesia Type: general ASA Status: 1          Anesthesia Type: No value filed.     Bradford Phase I: Bradford Score: 9    Bradford Phase II:        Anesthesia Post Evaluation    Patient location during evaluation: PACU  Patient participation: complete - patient participated  Level of consciousness: awake and alert  Pain score: 3  Airway patency: patent  Nausea & Vomiting: no nausea and no vomiting  Complications: no  Cardiovascular status: hemodynamically stable and blood pressure returned to baseline  Respiratory status: spontaneous ventilation, room air and acceptable  Hydration status: stable  Pain management: adequate and satisfactory to patient

## 2023-08-04 NOTE — BRIEF OP NOTE
Brief Postoperative Note      Patient: Chapo Samuel  YOB: 2000  MRN: [id number]    Date of Procedure: 8/4/2023    Pre-Op Diagnosis Codes:     * Nasal septal deviation [J34.2]     * Hypertrophy of inferior nasal turbinate [J34.3]     * Nasal valve collapse [M95.0]     * Nasal obstruction [J34.89]    Post-Op Diagnosis: Same       Procedure(s):  Septoplasty Bilateral Inferior Turbinate Resection Left Nasal Valve Collapse Repair including Lateral Wall Implant from Septal Cartilage    Surgeon(s):  Steven Britton MD    Assistant:  * No surgical staff found *    Anesthesia: General    Estimated Blood Loss (mL): less than 50     Complications: None    Specimens:   ID Type Source Tests Collected by Time Destination   A : Right Inferior Turbinate Tissue Nose SURGICAL PATHOLOGY Steven Britton MD 8/4/2023 7132    B : Septum Tissue Nose SURGICAL PATHOLOGY Steven Britton MD 8/4/2023 6131    C : Left Inferior Turbinate Tissue Nose SURGICAL PATHOLOGY Steven Britton MD 8/4/2023 2606    D : Right and Left lateral nostril skin Tissue Nose SURGICAL PATHOLOGY Steven Britton MD 8/4/2023 3328        Implants:  * No implants in log *      Drains: * No LDAs found *    Findings: 1. Markedly obstructed left nasal airway  2. Bilateral valving likely secondary to similar lateral alar laxity on right side; performed vestibuloplasty on both nostrils with cartilage implants  3. Very thick broad maxillary crest; resected down to nasal floor level  4.   Good tip support at end of case        Electronically signed by Steven Britton MD on 8/4/2023 at 10:43 AM

## 2023-08-04 NOTE — PROGRESS NOTES
Pt returned to [facility] room 4. Vitals and assessment as charted. 0.9 infusing, @700ml to count from PACU. Pt has crackers and water. Family at the bedside. Pt and family verbalized understanding of discharge criteria and call light use. Call light in reach.

## 2023-08-04 NOTE — PROGRESS NOTES
Patient admitted to [facility] room 4 with mother at bedside. Bed in low position side rails up call light in reach. Patient denies questions at this time.

## 2023-08-07 ENCOUNTER — TELEPHONE (OUTPATIENT)
Dept: ENT CLINIC | Age: 23
End: 2023-08-07

## 2023-08-07 NOTE — TELEPHONE ENCOUNTER
Rx for Zofran sent in. Some patients get N/V from narcotic pain medication. May need to switch to extra strength Tylenol, but if not controlling pain, would consider changing to Mayelin Lock.  If N/V persists after that, may need to go to UC/ER for injection or alternative would be suppository.

## 2023-08-07 NOTE — TELEPHONE ENCOUNTER
Called patient's mom. Informed her of what Crystal said. Mom said she thinks it may be more the antibiotic causing the n/v rather than the pain medication. Advised mom to try the Zofran and let us know if patient does not improve. Patient's mom verbalized understanding and thanked me.

## 2023-08-07 NOTE — TELEPHONE ENCOUNTER
Patient's mom called in stating patient had surgery on Friday by Dr Gisele Ybarra. Patient is status post  Septoplasty Bilateral Inferior Turbinate Resection Left Nasal Valve Collapse Repair including Lateral Wall Implant from Septal Cartilage. Patient did ok over the weekend per mom but starting last night and so far this morning patient has not been able to keep anything down, including her pain medication. Patient is very nauseous and has been vomiting up anything she trys to eat or drink. Mom is asking if something can be sent into the pharmacy for the nausea and vomiting. Mom also asked if there is something she can do at home for the patient. Pharmacy is correct in [address] [place], [place] [address]way [address]. Please advise.

## 2023-08-08 RX ORDER — ONDANSETRON 4 MG/1
4 TABLET, ORALLY DISINTEGRATING ORAL EVERY 8 HOURS PRN
Qty: 12 TABLET | Refills: 0 | Status: SHIPPED | OUTPATIENT
Start: 2023-08-08

## 2023-08-08 NOTE — TELEPHONE ENCOUNTER
I called and let the patient know Nicolette Lowe sent in the Rx. Patient verbalized understanding and thanked me.

## 2023-08-08 NOTE — TELEPHONE ENCOUNTER
Patient's mom called in again stating she went to the pharmacy to  the Zofran but the pharmacy never got the Rx. I am not seeing it in Epic. Please send Zofran Rx into [facility] on [address].

## 2023-08-14 ENCOUNTER — OFFICE VISIT (OUTPATIENT)
Dept: ENT CLINIC | Age: 23
End: 2023-08-14
Payer: COMMERCIAL

## 2023-08-14 VITALS
DIASTOLIC BLOOD PRESSURE: 76 MMHG | HEIGHT: 64 IN | HEART RATE: 112 BPM | WEIGHT: 132.3 LBS | TEMPERATURE: 98.1 F | RESPIRATION RATE: 16 BRPM | SYSTOLIC BLOOD PRESSURE: 118 MMHG | OXYGEN SATURATION: 99 % | BODY MASS INDEX: 22.59 KG/M2

## 2023-08-14 DIAGNOSIS — J34.3 HYPERTROPHY OF INFERIOR NASAL TURBINATE: ICD-10-CM

## 2023-08-14 DIAGNOSIS — J34.89 NASAL OBSTRUCTION: ICD-10-CM

## 2023-08-14 DIAGNOSIS — Z98.890 STATUS POST NASAL SEPTOPLASTY: Primary | ICD-10-CM

## 2023-08-14 DIAGNOSIS — M95.0 NASAL VALVE COLLAPSE: ICD-10-CM

## 2023-08-14 DIAGNOSIS — J34.2 NASAL SEPTAL DEVIATION: ICD-10-CM

## 2023-08-14 PROCEDURE — 31237 NSL/SINS NDSC SURG BX POLYPC: CPT | Performed by: OTOLARYNGOLOGY

## 2023-08-14 PROCEDURE — 99024 POSTOP FOLLOW-UP VISIT: CPT | Performed by: OTOLARYNGOLOGY

## 2023-08-14 NOTE — PROGRESS NOTES
[facility] [place] [facility]  [facility] EAR, NOSE AND THROAT  [address]  Dept: [phone]  Dept Fax: [phone]  Loc: [phone]    Brionna Nichols is a 23 y.o. female who was referred by No ref. provider found for:  Chief Complaint   Patient presents with    Post-Op Check     Patient here for a post op exam, septo, nasal valve implant, gracy inf turbs. Patient stated she feels stuffed up and has felt dizzy ever since the surgery. Patient stated she has no pain. She would also like to discuss the Afrin spray. HPI:     Brionna Nichols is a 23 y.o. female status post nasal septal reconstruction and bilateral inferior turbinate reduction for chronic mouth breathing and chronic nasal airway obstruction. The patient reports having done acceptably well with nasal splints in place with some ability to breathe through both nostrils at times. She has used her topical Bactroban as directed and completed her oral antibiotic course today. She reports no worsening of any of the pain that she was experiencing and overall having remained comfortable. History: Allergies   Allergen Reactions    Amoxicillin Rash     Current Outpatient Medications   Medication Sig Dispense Refill    ondansetron (ZOFRAN-ODT) 4 MG disintegrating tablet Take 1 tablet by mouth every 8 hours as needed for Nausea or Vomiting 12 tablet 0    sertraline (ZOLOFT) 100 MG tablet TAKE 1 TABLET BY MOUTH EVERY DAY FOR 30 DAYS      medroxyPROGESTERone (DEPO-PROVERA) 150 MG/ML injection Inject 1 mL into the muscle every 3 months       No current facility-administered medications for this visit. History reviewed. No pertinent past medical history.    Past Surgical History:   Procedure Laterality Date    COLONOSCOPY      SEPTOPLASTY N/A 8/4/2023    Septoplasty Bilateral Inferior Turbinate Resection Left Nasal Valve Collapse Repair including Lateral Wall Implant from Septal Cartilage

## 2023-08-29 ENCOUNTER — OFFICE VISIT (OUTPATIENT)
Dept: ENT CLINIC | Age: 23
End: 2023-08-29

## 2023-08-29 VITALS
DIASTOLIC BLOOD PRESSURE: 62 MMHG | SYSTOLIC BLOOD PRESSURE: 104 MMHG | BODY MASS INDEX: 22.84 KG/M2 | OXYGEN SATURATION: 99 % | RESPIRATION RATE: 18 BRPM | WEIGHT: 133.8 LBS | HEART RATE: 94 BPM | HEIGHT: 64 IN | TEMPERATURE: 97.2 F

## 2023-08-29 DIAGNOSIS — R51.9 NONINTRACTABLE EPISODIC HEADACHE, UNSPECIFIED HEADACHE TYPE: ICD-10-CM

## 2023-08-29 DIAGNOSIS — Z98.890 H/O NASAL SEPTOPLASTY: Primary | ICD-10-CM

## 2023-08-29 PROCEDURE — 99024 POSTOP FOLLOW-UP VISIT: CPT | Performed by: OTOLARYNGOLOGY

## 2023-08-29 NOTE — PROGRESS NOTES
[facility] [place] [facility]  [facility] EAR, NOSE AND THROAT  [address] [address]  Dept: [phone]  Dept Fax: [phone]  Loc: [phone]    Olga Duran is a 23 y.o. female who was referred by No ref. provider found for:  Chief Complaint   Patient presents with    Post-Op Check     Patient here for post op exam       HPI:     Olga Duran is a 23 y.o. female 1 month status post nasal septal reconstruction and inferior turbinate reduction. The patient reports having been doing well overall, breathing comfortably and sleeping well. She did notice recently that she had a sudden clicking sensation in her septum and that she noticed a drop-down of how well she was breathing through the left side. It is still much better than it was preop. She denies any clear rhinorrhea but is complaining of frontal headaches that are relatively mild. She has not been taking any ibuprofen. She has not had anyone listen to her breathing at night and has not used her snore lab postoperatively. History: Allergies   Allergen Reactions    Amoxicillin Rash     Current Outpatient Medications   Medication Sig Dispense Refill    ondansetron (ZOFRAN-ODT) 4 MG disintegrating tablet Take 1 tablet by mouth every 8 hours as needed for Nausea or Vomiting 12 tablet 0    medroxyPROGESTERone (DEPO-PROVERA) 150 MG/ML injection Inject 1 mL into the muscle every 3 months       No current facility-administered medications for this visit. No past medical history on file.    Past Surgical History:   Procedure Laterality Date    COLONOSCOPY      SEPTOPLASTY N/A 8/4/2023    Septoplasty Bilateral Inferior Turbinate Resection Left Nasal Valve Collapse Repair including Lateral Wall Implant from Septal Cartilage performed by Michael Delgadillo MD at [facility]       Family History   Problem Relation Age of Onset    Heart Disease Mother         a fib    Asthma

## 2024-01-16 ENCOUNTER — OFFICE VISIT (OUTPATIENT)
Dept: FAMILY MEDICINE CLINIC | Age: 24
End: 2024-01-16
Payer: COMMERCIAL

## 2024-01-16 VITALS
DIASTOLIC BLOOD PRESSURE: 68 MMHG | RESPIRATION RATE: 12 BRPM | BODY MASS INDEX: 24.34 KG/M2 | SYSTOLIC BLOOD PRESSURE: 102 MMHG | HEART RATE: 80 BPM | TEMPERATURE: 98.1 F | WEIGHT: 141.8 LBS

## 2024-01-16 DIAGNOSIS — R53.83 FATIGUE, UNSPECIFIED TYPE: ICD-10-CM

## 2024-01-16 DIAGNOSIS — Z13.1 SCREENING FOR DIABETES MELLITUS: ICD-10-CM

## 2024-01-16 DIAGNOSIS — K58.2 IRRITABLE BOWEL SYNDROME WITH BOTH CONSTIPATION AND DIARRHEA: ICD-10-CM

## 2024-01-16 DIAGNOSIS — Z76.89 ENCOUNTER TO ESTABLISH CARE: Primary | ICD-10-CM

## 2024-01-16 DIAGNOSIS — M24.859 SNAPPING HIP, UNSPECIFIED LATERALITY: ICD-10-CM

## 2024-01-16 DIAGNOSIS — Z13.220 SCREENING, LIPID: ICD-10-CM

## 2024-01-16 DIAGNOSIS — F41.9 ANXIETY: ICD-10-CM

## 2024-01-16 PROCEDURE — 99204 OFFICE O/P NEW MOD 45 MIN: CPT | Performed by: NURSE PRACTITIONER

## 2024-01-16 RX ORDER — SERTRALINE HYDROCHLORIDE 100 MG/1
100 TABLET, FILM COATED ORAL DAILY
COMMUNITY
End: 2024-01-16 | Stop reason: SDUPTHER

## 2024-01-16 RX ORDER — SERTRALINE HYDROCHLORIDE 100 MG/1
100 TABLET, FILM COATED ORAL DAILY
COMMUNITY
Start: 2023-10-12 | End: 2024-01-10

## 2024-01-16 RX ORDER — SERTRALINE HYDROCHLORIDE 100 MG/1
100 TABLET, FILM COATED ORAL DAILY
Qty: 90 TABLET | Refills: 3 | Status: SHIPPED | OUTPATIENT
Start: 2024-01-16

## 2024-01-16 ASSESSMENT — PATIENT HEALTH QUESTIONNAIRE - PHQ9
SUM OF ALL RESPONSES TO PHQ QUESTIONS 1-9: 0
SUM OF ALL RESPONSES TO PHQ QUESTIONS 1-9: 0
2. FEELING DOWN, DEPRESSED OR HOPELESS: 0
SUM OF ALL RESPONSES TO PHQ QUESTIONS 1-9: 0
1. LITTLE INTEREST OR PLEASURE IN DOING THINGS: 0
SUM OF ALL RESPONSES TO PHQ9 QUESTIONS 1 & 2: 0
SUM OF ALL RESPONSES TO PHQ QUESTIONS 1-9: 0

## 2024-01-16 ASSESSMENT — ENCOUNTER SYMPTOMS
NAUSEA: 0
VOMITING: 0
BLOOD IN STOOL: 0
CONSTIPATION: 0
SHORTNESS OF BREATH: 0
DIARRHEA: 0

## 2024-01-16 NOTE — PROGRESS NOTES
Chief Complaint   Patient presents with    New Patient     Here to establish care. Previously followed with pediatrician, Dr. Carr.       SUBJECTIVE     Destiney Macario is a 23 y.o.female      Pt presents to establish PCP- previous physician was Dr Silver.  Date last seen by physician- Within the last year.      Had sinus surgery in August. She feels much better since then. Dr Mace.     Pt does have IBS C&D. She has had a neg colonoscopy in the past with [facility].    Saw cardiology in the past for presyncope. Workup was negative at that time. She does get low BP at times.     [facility] - Dr Macias for hips. Has snapping hip syndrome and an impingement. Has had MRIs and has had steroids injections. She works at [employer] and can see him prn.    Current medical problems include:  Patient Active Problem List   Diagnosis    Generalized abdominal pain    Leukocytosis    Nasal obstruction    Nasal septal deviation    Hypertrophy of inferior nasal turbinate    Nasal valve collapse    Status post nasal septoplasty    H/O nasal septoplasty    Nonintractable episodic headache    Irritable bowel syndrome with both constipation and diarrhea    Anxiety       Past Medical History:   Diagnosis Date    Anxiety     Irritable bowel syndrome    Saw GI and had a colonoscopy 3-4 years ago but there were no concerns.   [facility] [place]    Past Surgical History:   Procedure Laterality Date    COLONOSCOPY      SEPTOPLASTY N/A 8/4/2023    Septoplasty Bilateral Inferior Turbinate Resection Left Nasal Valve Collapse Repair including Lateral Wall Implant from Septal Cartilage performed by John Mace MD at [facility] OR    WISDOM TOOTH EXTRACTION       Allergies   Allergen Reactions    Amoxicillin Rash          Current Outpatient Medications:     sertraline (ZOLOFT) 100 MG tablet, Take 1 tablet by mouth daily, Disp: 90 tablet, Rfl: 3    medroxyPROGESTERone (DEPO-PROVERA) 150 MG/ML injection, Inject 1 mL into the muscle every 3

## 2024-01-22 LAB
CHOLESTEROL, TOTAL: 132 MG/DL
CHOLESTEROL/HDL RATIO: NORMAL
HDLC SERPL-MCNC: 44 MG/DL (ref 35–70)
LDL CHOLESTEROL CALCULATED: 73 MG/DL (ref 0–160)
NONHDLC SERPL-MCNC: NORMAL MG/DL
TRIGL SERPL-MCNC: 76 MG/DL
VLDLC SERPL CALC-MCNC: NORMAL MG/DL

## 2024-01-24 ENCOUNTER — TELEPHONE (OUTPATIENT)
Dept: FAMILY MEDICINE CLINIC | Age: 24
End: 2024-01-24

## 2024-01-24 NOTE — TELEPHONE ENCOUNTER
Detailed message left with normal results, ok per hippa. Pt advised to return call with any questions.

## 2024-03-26 ENCOUNTER — OFFICE VISIT (OUTPATIENT)
Dept: ENT CLINIC | Age: 24
End: 2024-03-26
Payer: COMMERCIAL

## 2024-03-26 VITALS
SYSTOLIC BLOOD PRESSURE: 110 MMHG | DIASTOLIC BLOOD PRESSURE: 62 MMHG | HEIGHT: 64 IN | WEIGHT: 142.8 LBS | BODY MASS INDEX: 24.38 KG/M2 | RESPIRATION RATE: 16 BRPM | HEART RATE: 92 BPM | OXYGEN SATURATION: 98 % | TEMPERATURE: 96.9 F

## 2024-03-26 DIAGNOSIS — Z98.890 H/O NASAL SEPTOPLASTY: Primary | ICD-10-CM

## 2024-03-26 PROCEDURE — 99212 OFFICE O/P EST SF 10 MIN: CPT | Performed by: NURSE PRACTITIONER

## 2024-03-29 NOTE — PROGRESS NOTES
[facility] [place] [facility]  [facility] EAR, NOSE AND THROAT  [address]  [address]  [place] [id number]  Dept: [phone]  Dept Fax: [phone]  Loc: [phone]    HPI:     Destiney Macario is a 23 y.o. female patient here for follow up regarding her nasal airway.  She is s/p Nasal septoplasty, bilateral inferior turbinate reduction and left nasal valve repair 20023 with Dr Lozano.  She reports breathing well through her nose without any congestion or obstructive symptoms at rest and with activity.  Her headaches are now well controlled with Topamax.  She denies any other issues or concerns today.    History:     Allergies   Allergen Reactions    Amoxicillin Rash     Current Outpatient Medications   Medication Sig Dispense Refill    sertraline (ZOLOFT) 100 MG tablet Take 1 tablet by mouth daily 90 tablet 3    medroxyPROGESTERone (DEPO-PROVERA) 150 MG/ML injection Inject 1 mL into the muscle every 3 months       No current facility-administered medications for this visit.     Past Medical History:   Diagnosis Date    Anxiety     Irritable bowel syndrome       Past Surgical History:   Procedure Laterality Date    COLONOSCOPY      SEPTOPLASTY N/A 8/4/2023    Septoplasty Bilateral Inferior Turbinate Resection Left Nasal Valve Collapse Repair including Lateral Wall Implant from Septal Cartilage performed by John Mace MD at [facility] OR    WISDOM TOOTH EXTRACTION       Family History   Problem Relation Age of Onset    Heart Disease Mother         a fib    Atrial Fibrillation Mother     Asthma Father     Other Father         dead    High Blood Pressure Maternal Grandmother     High Blood Pressure Maternal Grandfather     Other Paternal Grandfather         hepatitis c     Social History     Tobacco Use    Smoking status: Never     Passive exposure: Never    Smokeless tobacco: Never   Substance Use Topics    Alcohol use: Yes     Alcohol/week: 2.0 standard drinks of alcohol     Types: 1

## 2024-06-11 ENCOUNTER — NURSE ONLY (OUTPATIENT)
Dept: LAB | Age: 24
End: 2024-06-11

## 2024-06-21 LAB — CYTOLOGY THIN PREP PAP: NORMAL

## 2024-08-26 ENCOUNTER — HOSPITAL ENCOUNTER (EMERGENCY)
Age: 24
Discharge: HOME | End: 2024-08-26
Payer: COMMERCIAL

## 2024-08-26 VITALS
TEMPERATURE: 99.1 F | OXYGEN SATURATION: 98 % | WEIGHT: 140 LBS | HEIGHT: 64 IN | BODY MASS INDEX: 23.9 KG/M2 | RESPIRATION RATE: 16 BRPM | SYSTOLIC BLOOD PRESSURE: 127 MMHG | DIASTOLIC BLOOD PRESSURE: 85 MMHG | HEART RATE: 94 BPM

## 2024-08-26 DIAGNOSIS — H65.93 MIDDLE EAR EFFUSION, BILATERAL: Primary | ICD-10-CM

## 2024-08-26 DIAGNOSIS — H66.92 INFECTIVE OTITIS MEDIA, LEFT: ICD-10-CM

## 2024-08-26 PROCEDURE — 99213 OFFICE O/P EST LOW 20 MIN: CPT

## 2024-08-26 RX ORDER — FLUTICASONE PROPIONATE 50 MCG
2 SPRAY, SUSPENSION (ML) NASAL DAILY
Qty: 16 G | Refills: 0 | Status: SHIPPED | OUTPATIENT
Start: 2024-08-26

## 2024-08-26 RX ORDER — AZITHROMYCIN 250 MG/1
TABLET, FILM COATED ORAL
Qty: 6 TABLET | Refills: 0 | Status: SHIPPED | OUTPATIENT
Start: 2024-08-26 | End: 2024-09-05

## 2024-08-26 ASSESSMENT — PAIN DESCRIPTION - LOCATION: LOCATION: EAR

## 2024-08-26 ASSESSMENT — PAIN DESCRIPTION - ORIENTATION: ORIENTATION: RIGHT;LEFT

## 2024-08-26 ASSESSMENT — PAIN SCALES - GENERAL: PAINLEVEL_OUTOF10: 2

## 2024-08-26 ASSESSMENT — PAIN - FUNCTIONAL ASSESSMENT: PAIN_FUNCTIONAL_ASSESSMENT: 0-10

## 2024-08-26 NOTE — DISCHARGE INSTRUCTIONS
Take antibiotics as prescribed until they are gone even if you are feeling better.  Please use Flonase as prescribed every day, this helps decrease inflammation within the eustachian tubes and facilitate drainage from both ears.    Please hydrate well keeping urine clear/pale yellow.    Okay to alternate Tylenol/Motrin every 3 hours to prevent body aches/pain.    Ear infections are not contagious, no need to take additional days off work/school.    See your family doctor if symptoms fail to improve or sooner if they worsen, return to ER/urgent care for any other urgent/emergent medical concerns.    Hope you are feeling better soon!

## 2024-08-26 NOTE — ED TRIAGE NOTES
Pt complains of headache congestion and ear pain since Friday not getting better now ear feels full.

## 2024-08-26 NOTE — ED PROVIDER NOTES
alcohol use of about 2.0 standard drinks of alcohol per week. She reports that she does not use drugs.  PHYSICAL EXAM     ED TRIAGE VITALS  BP: 127/85, Temp: 99.1 °F (37.3 °C), Pulse: 94, Respirations: 16, SpO2: 98 %,Estimated body mass index is 24.03 kg/m² as calculated from the following:    Height as of this encounter: 1.626 m (5' 4\").    Weight as of this encounter: 63.5 kg (140 lb).,No LMP recorded. Patient has had an injection.  Physical Exam  Vitals and nursing note reviewed.   Constitutional:       General: She is not in acute distress.     Appearance: Normal appearance. She is not ill-appearing, toxic-appearing or diaphoretic.   HENT:      Right Ear: Ear canal and external ear normal. Decreased hearing noted. No drainage, swelling or tenderness. There is no impacted cerumen. No foreign body. No mastoid tenderness. Tympanic membrane is bulging. Tympanic membrane is not injected.      Left Ear: Hearing and external ear normal. No decreased hearing noted. No drainage, swelling or tenderness. There is no impacted cerumen. No foreign body. No mastoid tenderness. Tympanic membrane is injected and bulging.      Nose: Nose normal. No congestion or rhinorrhea.      Mouth/Throat:      Mouth: Mucous membranes are moist.   Eyes:      Pupils: Pupils are equal, round, and reactive to light.   Neck:      Vascular: No carotid bruit.   Cardiovascular:      Rate and Rhythm: Normal rate and regular rhythm.      Pulses: Normal pulses.      Heart sounds: Normal heart sounds. No murmur heard.     No friction rub. No gallop.   Pulmonary:      Effort: Pulmonary effort is normal. No respiratory distress.      Breath sounds: Normal breath sounds. No stridor. No wheezing or rhonchi.   Abdominal:      General: Abdomen is flat.      Palpations: Abdomen is soft.      Tenderness: There is no abdominal tenderness. There is no guarding.   Musculoskeletal:         General: No swelling or tenderness. Normal range of motion.      Cervical  back: Normal range of motion and neck supple. No rigidity or tenderness.   Lymphadenopathy:      Cervical: No cervical adenopathy.   Skin:     General: Skin is dry.      Capillary Refill: Capillary refill takes less than 2 seconds.      Findings: No bruising or lesion.   Neurological:      General: No focal deficit present.      Mental Status: She is alert and oriented to person, place, and time.   Psychiatric:         Mood and Affect: Mood normal.         Behavior: Behavior normal.       DIAGNOSTIC RESULTS   Labs:No results found for this visit on 08/26/24.  IMAGING:  No orders to display     EKG:  URGENT CARE COURSE:     Vitals:    08/26/24 1840   BP: 127/85   Pulse: 94   Resp: 16   Temp: 99.1 °F (37.3 °C)   SpO2: 98%   Weight: 63.5 kg (140 lb)   Height: 1.626 m (5' 4\")     Medications - No data to display  PROCEDURES: (None if blank)  Procedures:   FINAL IMPRESSION      1. Middle ear effusion, bilateral    2. Infective otitis media, left      DISPOSITION/ PLAN   PATIENT REFERRED TO:  Nicky Ceron APRN - CNP  [address]  / [facility] [id number]  DISCHARGE MEDICATIONS:  New Prescriptions    AZITHROMYCIN (ZITHROMAX) 250 MG TABLET    500mg on day 1 followed by 250mg on days 2 - 5    FLUTICASONE (FLONASE) 50 MCG/ACT NASAL SPRAY    2 sprays by Each Nostril route daily     Discontinued Medications    No medications on file     Current Discharge Medication List        CHEYENNE Tinsley CNP    (Please note that portions of this note were completed with a voice recognition program. Efforts were made to edit the dictations but occasionally words are mis-transcribed.)            Adam Sheridan APRN - CNP  08/26/24 3020

## 2024-08-26 NOTE — DISCHARGE INSTR - COC
Continuity of Care Form    Patient Name: Destiney Macario   :  2000  MRN:  [id number]    Admit date:  2024  Discharge date:  ***    Code Status Order: Prior   Advance Directives:   Advance Care Flowsheet Documentation             Admitting Physician:  No admitting provider for patient encounter.  PCP: Nicky Ceron APRN - CNP    Discharging Nurse: ***  Discharging Hospital Unit/Room#:   Discharging Unit Phone Number: ***    Emergency Contact:   Extended Emergency Contact Information  Primary Emergency Contact: MAHI ZHANG  Home Phone: [phone]  Relation: Grandparent   needed? No  Secondary Emergency Contact: Joyce Gar  Address: [address] [place] [address] [facility]  Home Phone: [phone]  Mobile Phone: [phone]  Relation: Parent    Past Surgical History:  Past Surgical History:   Procedure Laterality Date    COLONOSCOPY      SEPTOPLASTY N/A 2023    Septoplasty Bilateral Inferior Turbinate Resection Left Nasal Valve Collapse Repair including Lateral Wall Implant from Septal Cartilage performed by John Mace MD at [facility] OR    WISDOM TOOTH EXTRACTION         Immunization History:     There is no immunization history on file for this patient.    Active Problems:  Patient Active Problem List   Diagnosis Code    Generalized abdominal pain R10.84    Leukocytosis D72.829    Nasal obstruction J34.89    Nasal septal deviation J34.2    Hypertrophy of inferior nasal turbinate J34.3    Nasal valve collapse M95.0    Status post nasal septoplasty Z98.890    H/O nasal septoplasty Z98.890    Nonintractable episodic headache R51.9    Irritable bowel syndrome with both constipation and diarrhea K58.2    Anxiety F41.9       Isolation/Infection:   Isolation            No Isolation          Patient Infection Status       None to display            Nurse Assessment:  Last Vital Signs: /85   Pulse 94   Temp 99.1 °F (37.3 °C)   Resp 16  belongings (please select all that are sent with patient):  {CHP DME Belongings:683725499}    RN SIGNATURE:  {Esignature:807895686}    CASE MANAGEMENT/SOCIAL WORK SECTION    Inpatient Status Date: ***    Readmission Risk Assessment Score:  Readmission Risk              Risk of Unplanned Readmission:  0           Discharging to Facility/ Agency   Name:   Address:  Phone:  Fax:    Dialysis Facility (if applicable)   Name:  Address:  Dialysis Schedule:  Phone:  Fax:    / signature: {Esignature:677613088}    PHYSICIAN SECTION    Prognosis: {Prognosis:4208308808}    Condition at Discharge: { Patient Condition:643401733}    Rehab Potential (if transferring to Rehab): {Prognosis:8370271837}    Recommended Labs or Other Treatments After Discharge: ***    Physician Certification: I certify the above information and transfer of Destiney Macario  is necessary for the continuing treatment of the diagnosis listed and that she requires {Admit to Appropriate Level of Care:11777} for {GREATER/LESS:523994392} 30 days.     Update Admission H&P: {CHP DME Changes in HandP:623398683}    PHYSICIAN SIGNATURE:  {Esignature:117521887}

## 2024-12-10 ENCOUNTER — OFFICE VISIT (OUTPATIENT)
Dept: FAMILY MEDICINE CLINIC | Age: 24
End: 2024-12-10

## 2024-12-10 VITALS
SYSTOLIC BLOOD PRESSURE: 102 MMHG | DIASTOLIC BLOOD PRESSURE: 70 MMHG | WEIGHT: 151.2 LBS | HEART RATE: 84 BPM | TEMPERATURE: 98.1 F | RESPIRATION RATE: 12 BRPM | BODY MASS INDEX: 25.95 KG/M2

## 2024-12-10 DIAGNOSIS — F41.9 ANXIETY: Primary | ICD-10-CM

## 2024-12-10 DIAGNOSIS — G43.109 MIGRAINE WITH AURA AND WITHOUT STATUS MIGRAINOSUS, NOT INTRACTABLE: ICD-10-CM

## 2024-12-10 DIAGNOSIS — R51.9 FREQUENT HEADACHES: ICD-10-CM

## 2024-12-10 RX ORDER — TOPIRAMATE 50 MG/1
50 TABLET, FILM COATED ORAL
Qty: 30 TABLET | Refills: 3 | Status: SHIPPED | OUTPATIENT
Start: 2024-12-10

## 2024-12-10 RX ORDER — SERTRALINE HYDROCHLORIDE 100 MG/1
100 TABLET, FILM COATED ORAL DAILY
Qty: 90 TABLET | Refills: 3 | Status: SHIPPED | OUTPATIENT
Start: 2024-12-10

## 2024-12-10 RX ORDER — RIZATRIPTAN BENZOATE 10 MG/1
10 TABLET ORAL
Qty: 9 TABLET | Refills: 1 | Status: SHIPPED | OUTPATIENT
Start: 2024-12-10 | End: 2024-12-10

## 2024-12-10 RX ORDER — ONDANSETRON 4 MG/1
4 TABLET, ORALLY DISINTEGRATING ORAL 3 TIMES DAILY PRN
Qty: 21 TABLET | Refills: 1 | Status: SHIPPED | OUTPATIENT
Start: 2024-12-10

## 2024-12-10 SDOH — ECONOMIC STABILITY: FOOD INSECURITY: WITHIN THE PAST 12 MONTHS, THE FOOD YOU BOUGHT JUST DIDN'T LAST AND YOU DIDN'T HAVE MONEY TO GET MORE.: NEVER TRUE

## 2024-12-10 SDOH — ECONOMIC STABILITY: FOOD INSECURITY: WITHIN THE PAST 12 MONTHS, YOU WORRIED THAT YOUR FOOD WOULD RUN OUT BEFORE YOU GOT MONEY TO BUY MORE.: NEVER TRUE

## 2024-12-10 SDOH — ECONOMIC STABILITY: INCOME INSECURITY: HOW HARD IS IT FOR YOU TO PAY FOR THE VERY BASICS LIKE FOOD, HOUSING, MEDICAL CARE, AND HEATING?: NOT HARD AT ALL

## 2024-12-10 ASSESSMENT — ENCOUNTER SYMPTOMS
NAUSEA: 1
PHOTOPHOBIA: 1
VOMITING: 0
BLOOD IN STOOL: 0
DIARRHEA: 0
SHORTNESS OF BREATH: 0
CONSTIPATION: 0

## 2024-12-10 NOTE — PROGRESS NOTES
Chief Complaint   Patient presents with    Migraine     Increased in frequency lately. 1 migraine every 2 weeks, normal headache 3-4 times weekly. Using ibuprofen.     Medication Refill     Orders pended.          SUBJECTIVE     Destiney Macario is a 24 y.o.female      Pt complains of more headaches and migraines for the last 6+ months. The migraines normally bother the front center of her head. Headaches are generally at her temples. Is getting headaches several times weekly and a migraine every other week. The intensity of the head pain is getting worse as well. Struggling to sleep due to the pain. She is getting nauseated, she is getting tunnel vision, and sensitivity to light and sound.    Was on depo provera for at least 7 years. Is concerned about the long term implications of this. She did go off in November.       Review of Systems   Constitutional:  Negative for chills, diaphoresis and fever.   Eyes:  Positive for photophobia and visual disturbance.   Respiratory:  Negative for shortness of breath.    Cardiovascular:  Negative for chest pain, palpitations and leg swelling.   Gastrointestinal:  Positive for nausea. Negative for blood in stool, constipation, diarrhea and vomiting.   Genitourinary:  Negative for dysuria and hematuria.   Musculoskeletal:  Negative for myalgias.   Neurological:  Positive for headaches. Negative for dizziness.   All other systems reviewed and are negative.        OBJECTIVE     /70   Pulse 84   Temp 98.1 °F (36.7 °C) (Oral)   Resp 12   Wt 68.6 kg (151 lb 3.2 oz)   BMI 25.95 kg/m²     Physical Exam  Vitals and nursing note reviewed.   Constitutional:       Appearance: She is well-developed.   HENT:      Head: Normocephalic and atraumatic.      Right Ear: External ear normal.      Left Ear: External ear normal.      Nose: Nose normal.   Eyes:      Conjunctiva/sclera: Conjunctivae normal.      Pupils: Pupils are equal, round, and reactive to light.   Cardiovascular:

## 2025-01-09 ENCOUNTER — OFFICE VISIT (OUTPATIENT)
Dept: FAMILY MEDICINE CLINIC | Age: 25
End: 2025-01-09

## 2025-01-09 VITALS
BODY MASS INDEX: 26.02 KG/M2 | HEART RATE: 86 BPM | WEIGHT: 151.6 LBS | DIASTOLIC BLOOD PRESSURE: 72 MMHG | SYSTOLIC BLOOD PRESSURE: 110 MMHG | RESPIRATION RATE: 16 BRPM | TEMPERATURE: 98.4 F

## 2025-01-09 DIAGNOSIS — G43.109 MIGRAINE WITH AURA AND WITHOUT STATUS MIGRAINOSUS, NOT INTRACTABLE: Primary | ICD-10-CM

## 2025-01-09 DIAGNOSIS — R51.9 FREQUENT HEADACHES: ICD-10-CM

## 2025-01-09 DIAGNOSIS — F41.9 ANXIETY: ICD-10-CM

## 2025-01-09 SDOH — ECONOMIC STABILITY: FOOD INSECURITY: WITHIN THE PAST 12 MONTHS, YOU WORRIED THAT YOUR FOOD WOULD RUN OUT BEFORE YOU GOT MONEY TO BUY MORE.: NEVER TRUE

## 2025-01-09 SDOH — ECONOMIC STABILITY: FOOD INSECURITY: WITHIN THE PAST 12 MONTHS, THE FOOD YOU BOUGHT JUST DIDN'T LAST AND YOU DIDN'T HAVE MONEY TO GET MORE.: NEVER TRUE

## 2025-01-09 ASSESSMENT — PATIENT HEALTH QUESTIONNAIRE - PHQ9
SUM OF ALL RESPONSES TO PHQ QUESTIONS 1-9: 0
SUM OF ALL RESPONSES TO PHQ9 QUESTIONS 1 & 2: 0
2. FEELING DOWN, DEPRESSED OR HOPELESS: NOT AT ALL
SUM OF ALL RESPONSES TO PHQ QUESTIONS 1-9: 0
1. LITTLE INTEREST OR PLEASURE IN DOING THINGS: NOT AT ALL

## 2025-01-09 ASSESSMENT — ENCOUNTER SYMPTOMS
CONSTIPATION: 0
SHORTNESS OF BREATH: 0
BLOOD IN STOOL: 0
DIARRHEA: 0
NAUSEA: 0
VOMITING: 0

## 2025-01-09 NOTE — PROGRESS NOTES
Chief Complaint   Patient presents with    Follow-up     Pt here for f/u. Labs done          SUBJECTIVE     Destiney GARY Macario is a 24 y.o.female      Pt presents for follow up of headaches/migraines. She was started on Topamax and Maxalt at last visit. MRI brain was completed and this was unremarkable. Has had 3 migraines since the last appt. This is down from almost daily headaches. Maxalt is helping when migraines occur.   Pt did go off her depo provera recently so is unsure if migraines are related to hormones. 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 Drinks at lease 80 oz water daily.     Review of Systems   Constitutional:  Negative for chills, diaphoresis and fever.   Respiratory:  Negative for shortness of breath.    Cardiovascular:  Negative for chest pain, palpitations and leg swelling.   Gastrointestinal:  Negative for blood in stool, constipation, diarrhea, nausea and vomiting.   Genitourinary:  Negative for dysuria and hematuria.   Musculoskeletal:  Negative for myalgias.   Neurological:  Positive for headaches. Negative for dizziness.   All other systems reviewed and are negative.        OBJECTIVE     /72 (Site: Left Upper Arm, Position: Sitting)   Pulse 86   Temp 98.4 °F (36.9 °C)   Resp 16   Wt 68.8 kg (151 lb 9.6 oz)   BMI 26.02 kg/m²     Physical Exam  Vitals and nursing note reviewed.   Constitutional:       Appearance: She is well-developed.   HENT:      Head: Normocephalic and atraumatic.      Right Ear: External ear normal.      Left Ear: External ear normal.      Nose: Nose normal.   Eyes:      Conjunctiva/sclera: Conjunctivae normal.      Pupils: Pupils are equal, round, and reactive to light.   Pulmonary:      Effort: Pulmonary effort is normal.   Musculoskeletal:         General: Normal range of motion.      Cervical back: Normal range of motion and neck supple.   Neurological:      Mental Status: She is alert and oriented to person, place,

## 2025-02-05 DIAGNOSIS — G43.109 MIGRAINE WITH AURA AND WITHOUT STATUS MIGRAINOSUS, NOT INTRACTABLE: ICD-10-CM

## 2025-02-05 RX ORDER — RIZATRIPTAN BENZOATE 10 MG/1
10 TABLET ORAL
Qty: 9 TABLET | Refills: 1 | Status: SHIPPED | OUTPATIENT
Start: 2025-02-05 | End: 2025-02-05

## 2025-02-05 NOTE — TELEPHONE ENCOUNTER
This medication refill is regarding a electronic request. Refill requested by  [address] .    Requested Prescriptions     Pending Prescriptions Disp Refills    rizatriptan (MAXALT) 10 MG tablet [Pharmacy Med Name: RIZATRIPTAN 10 MG TABLET] 9 tablet 1     Sig: TAKE 1 TABLET BY MOUTH ONCE AS NEEDED FOR MIGRAINE MAY REPEAT IN 2 HOURS IF NEEDED     Date of last visit: 1/9/2025   Date of next visit: None  Date of last refill: 12/10/24 #9/1    Rx verified, ordered and set to EP.

## 2025-04-06 DIAGNOSIS — G43.109 MIGRAINE WITH AURA AND WITHOUT STATUS MIGRAINOSUS, NOT INTRACTABLE: ICD-10-CM

## 2025-04-07 RX ORDER — TOPIRAMATE 50 MG/1
50 TABLET, FILM COATED ORAL
Qty: 90 TABLET | Refills: 1 | Status: SHIPPED | OUTPATIENT
Start: 2025-04-07

## 2025-04-07 NOTE — TELEPHONE ENCOUNTER
Destiney Macario called requesting a refill on the following medications:  Requested Prescriptions     Pending Prescriptions Disp Refills    topiramate (TOPAMAX) 50 MG tablet [Pharmacy Med Name: TOPIRAMATE 50 MG TABLET] 90 tablet 1     Sig: TAKE 1 TABLET BY MOUTH NIGHTLY ONLY TAKE 1/2 TABLET NIGHTLY FOR 1 WEEK       Date of last visit: 1/9/2025  Date of next visit (if applicable):Visit date not found  Date of last refill: 12/10/2024   Pharmacy Name: [facility]/pharmacy #[id number] - [place], [place] - [address]       Thanks,  Shira Alexis MA

## 2025-04-13 ENCOUNTER — HOSPITAL ENCOUNTER (EMERGENCY)
Age: 25
Discharge: HOME | End: 2025-04-13
Attending: STUDENT IN AN ORGANIZED HEALTH CARE EDUCATION/TRAINING PROGRAM
Payer: COMMERCIAL

## 2025-04-13 VITALS
SYSTOLIC BLOOD PRESSURE: 114 MMHG | OXYGEN SATURATION: 96 % | HEART RATE: 88 BPM | TEMPERATURE: 98.3 F | WEIGHT: 151 LBS | RESPIRATION RATE: 15 BRPM | DIASTOLIC BLOOD PRESSURE: 72 MMHG | BODY MASS INDEX: 25.78 KG/M2 | HEIGHT: 64 IN

## 2025-04-13 DIAGNOSIS — R11.2 NAUSEA AND VOMITING, UNSPECIFIED VOMITING TYPE: Primary | ICD-10-CM

## 2025-04-13 LAB
ALBUMIN SERPL BCG-MCNC: 4.3 G/DL (ref 3.4–4.9)
ALP SERPL-CCNC: 113 U/L (ref 38–126)
ALT SERPL W/O P-5'-P-CCNC: 11 U/L (ref 10–35)
ANION GAP SERPL CALC-SCNC: 12 MEQ/L (ref 8–16)
AST SERPL-CCNC: 19 U/L (ref 10–35)
B-HCG SERPL QL: NEGATIVE
BASOPHILS ABSOLUTE: 0 THOU/MM3 (ref 0–0.1)
BASOPHILS NFR BLD AUTO: 0.3 %
BILIRUB CONJ SERPL-MCNC: 0.3 MG/DL (ref 0–0.2)
BILIRUB SERPL-MCNC: 0.5 MG/DL (ref 0.3–1.2)
BILIRUB UR QL STRIP.AUTO: NEGATIVE
BUN SERPL-MCNC: 12 MG/DL (ref 8–23)
CALCIUM SERPL-MCNC: 9.2 MG/DL (ref 8.6–10)
CHARACTER UR: CLEAR
CHLORIDE SERPL-SCNC: 106 MEQ/L (ref 98–111)
CO2 SERPL-SCNC: 22 MEQ/L (ref 22–29)
COLOR, UA: YELLOW
CREAT SERPL-MCNC: 0.9 MG/DL (ref 0.5–0.9)
DEPRECATED RDW RBC AUTO: 40.2 FL (ref 35–45)
EOSINOPHIL NFR BLD AUTO: 0.9 %
EOSINOPHILS ABSOLUTE: 0.1 THOU/MM3 (ref 0–0.4)
ERYTHROCYTE [DISTWIDTH] IN BLOOD BY AUTOMATED COUNT: 12.3 % (ref 11.5–14.5)
GFR SERPL CREATININE-BSD FRML MDRD: > 90 ML/MIN/1.73M2
GLUCOSE SERPL-MCNC: 94 MG/DL (ref 74–109)
GLUCOSE UR QL STRIP.AUTO: NEGATIVE MG/DL
HCT VFR BLD AUTO: 40.9 % (ref 37–47)
HGB BLD-MCNC: 14.3 GM/DL (ref 12–16)
HGB UR QL STRIP.AUTO: NEGATIVE
IMM GRANULOCYTES # BLD AUTO: 0.03 THOU/MM3 (ref 0–0.07)
IMM GRANULOCYTES NFR BLD AUTO: 0.3 %
KETONES UR QL STRIP.AUTO: 15
LIPASE SERPL-CCNC: 16 U/L (ref 13–60)
LYMPHOCYTES ABSOLUTE: 1.7 THOU/MM3 (ref 1–4.8)
LYMPHOCYTES NFR BLD AUTO: 17.7 %
MCH RBC QN AUTO: 31.2 PG (ref 26–33)
MCHC RBC AUTO-ENTMCNC: 35 GM/DL (ref 32.2–35.5)
MCV RBC AUTO: 89.1 FL (ref 81–99)
MONOCYTES ABSOLUTE: 0.7 THOU/MM3 (ref 0.4–1.3)
MONOCYTES NFR BLD AUTO: 6.7 %
NEUTROPHILS ABSOLUTE: 7.3 THOU/MM3 (ref 1.8–7.7)
NEUTROPHILS NFR BLD AUTO: 74.1 %
NITRITE UR QL STRIP: NEGATIVE
NRBC BLD AUTO-RTO: 0 /100 WBC
OSMOLALITY SERPL CALC.SUM OF ELEC: 278.9 MOSMOL/KG (ref 275–300)
PH UR STRIP.AUTO: 5.5 [PH] (ref 5–9)
PLATELET # BLD AUTO: 142 THOU/MM3 (ref 130–400)
PMV BLD AUTO: 12.4 FL (ref 9.4–12.4)
POTASSIUM SERPL-SCNC: 4 MEQ/L (ref 3.5–5.2)
PROT SERPL-MCNC: 6.9 G/DL (ref 6.4–8.3)
PROT UR STRIP.AUTO-MCNC: NEGATIVE MG/DL
RBC # BLD AUTO: 4.59 MILL/MM3 (ref 4.2–5.4)
SODIUM SERPL-SCNC: 140 MEQ/L (ref 135–145)
SP GR UR REFRACT.AUTO: 1.02 (ref 1–1.03)
UROBILINOGEN, URINE: 0.2 EU/DL (ref 0–1)
WBC # BLD AUTO: 9.8 THOU/MM3 (ref 4.8–10.8)
WBC #/AREA URNS HPF: NEGATIVE /[HPF]

## 2025-04-13 PROCEDURE — 80076 HEPATIC FUNCTION PANEL: CPT

## 2025-04-13 PROCEDURE — 6360000002 HC RX W HCPCS

## 2025-04-13 PROCEDURE — 80048 BASIC METABOLIC PNL TOTAL CA: CPT

## 2025-04-13 PROCEDURE — 96374 THER/PROPH/DIAG INJ IV PUSH: CPT

## 2025-04-13 PROCEDURE — 85025 COMPLETE CBC W/AUTO DIFF WBC: CPT

## 2025-04-13 PROCEDURE — 36415 COLL VENOUS BLD VENIPUNCTURE: CPT

## 2025-04-13 PROCEDURE — 2580000003 HC RX 258

## 2025-04-13 PROCEDURE — 81003 URINALYSIS AUTO W/O SCOPE: CPT

## 2025-04-13 PROCEDURE — 83690 ASSAY OF LIPASE: CPT

## 2025-04-13 PROCEDURE — 84703 CHORIONIC GONADOTROPIN ASSAY: CPT

## 2025-04-13 PROCEDURE — 96361 HYDRATE IV INFUSION ADD-ON: CPT

## 2025-04-13 PROCEDURE — 99284 EMERGENCY DEPT VISIT MOD MDM: CPT

## 2025-04-13 RX ORDER — METOCLOPRAMIDE HYDROCHLORIDE 5 MG/ML
10 INJECTION INTRAMUSCULAR; INTRAVENOUS ONCE
Status: COMPLETED | OUTPATIENT
Start: 2025-04-13 | End: 2025-04-13

## 2025-04-13 RX ORDER — 0.9 % SODIUM CHLORIDE 0.9 %
1000 INTRAVENOUS SOLUTION INTRAVENOUS ONCE
Status: COMPLETED | OUTPATIENT
Start: 2025-04-13 | End: 2025-04-13

## 2025-04-13 RX ADMIN — SODIUM CHLORIDE 1000 ML: 0.9 INJECTION, SOLUTION INTRAVENOUS at 17:50

## 2025-04-13 RX ADMIN — METOCLOPRAMIDE HYDROCHLORIDE 10 MG: 5 INJECTION INTRAMUSCULAR; INTRAVENOUS at 17:50

## 2025-04-13 ASSESSMENT — PAIN - FUNCTIONAL ASSESSMENT: PAIN_FUNCTIONAL_ASSESSMENT: NONE - DENIES PAIN

## 2025-04-13 NOTE — ED TRIAGE NOTES
Presents to ED with complaints of ongoing vomiting. Pt reports she hs been taking zofran with minimal relief. Denies diarrhea and constipation. Denies stomach pain.

## 2025-04-13 NOTE — ED PROVIDER NOTES
[facility] EMERGENCY DEPARTMENT  EMERGENCY DEPARTMENT ENCOUNTER          Pt Name: Destiney Macario  MRN: [id number]  Birthdate 2000  Date of evaluation: 4/13/2025  Physician: Yumi Santana MD  Supervising Attending Physician: Yong Schmitt MD       CHIEF COMPLAINT       Chief Complaint   Patient presents with    Vomiting         HISTORY OF PRESENT ILLNESS    HPI  Destiney Macario is a 24 y.o. female who presents to the emergency department from home, as a walk in to the ED lobby for evaluation of intractable nausea and vomiting.  Patient notes that she has \"chronic abdominal issues\", and that all week she has been nauseated and vomiting but has been controlled for the most part and would subside with Zofran, however today the nausea was intractable so he came in here.  She does note that she occasionally has blood when she wipes but no blood in the toilet.  She has been feeling a little dizzy but did not lose consciousness.  She denies any abdominal pain, fever, diarrhea, hematemesis, burning while urinating, frequency of urine, vaginal bleeding, vaginal discharge, headache, focal weakness, sensory changes, chest pain, shortness of breath, or loss of consciousness.  Patient notes that her LMP was April 3 lasting for 3 days.  She was on birth control which she stopped in November after \"reading a lawsuit about how the Depo shot causes brain tumors\".  Patient also noted she had a colonoscopy in 2021 and was informed that they could not find any causes for her abdominal issues but told her to eat more fiber.  Her last bowel movement was on Friday and it was regular for her.  The patient has no other acute complaints at this time.      REVIEW OF SYSTEMS   Review of Systems      PAST MEDICAL AND SURGICAL HISTORY     Past Medical History:   Diagnosis Date    Anxiety     Irritable bowel syndrome      Past Surgical History:   Procedure Laterality Date    COLONOSCOPY      SEPTOPLASTY N/A

## 2025-04-13 NOTE — ED NOTES
Patient updated on POC. Patient resting in bed. Visitor at bedside. Respirations easy and unlabored. No distress noted. Call light within reach.

## 2025-04-13 NOTE — DISCHARGE INSTRUCTIONS
Please follow-up with the PCP for reevaluation within 24 to 72 hours for long-term care  Please return to the ED if any concerns arise including but not limited to worsening nausea, worsening vomiting, or loss of consciousness

## 2025-04-27 ENCOUNTER — HOSPITAL ENCOUNTER (EMERGENCY)
Age: 25
Discharge: HOME | End: 2025-04-27
Attending: EMERGENCY MEDICINE
Payer: COMMERCIAL

## 2025-04-27 VITALS
SYSTOLIC BLOOD PRESSURE: 115 MMHG | WEIGHT: 145 LBS | BODY MASS INDEX: 24.75 KG/M2 | DIASTOLIC BLOOD PRESSURE: 69 MMHG | HEART RATE: 85 BPM | OXYGEN SATURATION: 99 % | TEMPERATURE: 97.8 F | RESPIRATION RATE: 16 BRPM | HEIGHT: 64 IN

## 2025-04-27 DIAGNOSIS — R11.2 NAUSEA AND VOMITING, UNSPECIFIED VOMITING TYPE: Primary | ICD-10-CM

## 2025-04-27 LAB
ALBUMIN SERPL BCG-MCNC: 4.3 G/DL (ref 3.4–4.9)
ALP SERPL-CCNC: 116 U/L (ref 38–126)
ALT SERPL W/O P-5'-P-CCNC: 13 U/L (ref 10–35)
ANION GAP SERPL CALC-SCNC: 14 MEQ/L (ref 8–16)
AST SERPL-CCNC: 22 U/L (ref 10–35)
B-HCG SERPL QL: NEGATIVE
BACTERIA URNS QL MICRO: ABNORMAL /HPF
BASOPHILS ABSOLUTE: 0 THOU/MM3 (ref 0–0.1)
BASOPHILS NFR BLD AUTO: 0.3 %
BILIRUB CONJ SERPL-MCNC: 0.3 MG/DL (ref 0–0.2)
BILIRUB SERPL-MCNC: 0.6 MG/DL (ref 0.3–1.2)
BILIRUB UR QL STRIP.AUTO: NEGATIVE
BUN SERPL-MCNC: 13 MG/DL (ref 8–23)
CALCIUM SERPL-MCNC: 9.3 MG/DL (ref 8.6–10)
CASTS #/AREA URNS LPF: ABNORMAL /LPF
CASTS 2: ABNORMAL /LPF
CHARACTER UR: ABNORMAL
CHLORIDE SERPL-SCNC: 107 MEQ/L (ref 98–111)
CO2 SERPL-SCNC: 19 MEQ/L (ref 22–29)
COLOR, UA: YELLOW
CREAT SERPL-MCNC: 0.7 MG/DL (ref 0.5–0.9)
CRYSTALS URNS MICRO: ABNORMAL
DEPRECATED RDW RBC AUTO: 42.6 FL (ref 35–45)
EKG ATRIAL RATE: 64 BPM
EKG P AXIS: 68 DEGREES
EKG P-R INTERVAL: 130 MS
EKG Q-T INTERVAL: 404 MS
EKG QRS DURATION: 78 MS
EKG QTC CALCULATION (BAZETT): 416 MS
EKG R AXIS: 92 DEGREES
EKG T AXIS: 45 DEGREES
EKG VENTRICULAR RATE: 64 BPM
EOSINOPHIL NFR BLD AUTO: 0.2 %
EOSINOPHILS ABSOLUTE: 0 THOU/MM3 (ref 0–0.4)
EPITHELIAL CELLS, UA: ABNORMAL /HPF
ERYTHROCYTE [DISTWIDTH] IN BLOOD BY AUTOMATED COUNT: 12.9 % (ref 11.5–14.5)
ETHANOL SERPL-MCNC: < 0.01 % (ref 0–0.08)
FLUAV RNA RESP QL NAA+PROBE: NOT DETECTED
FLUBV RNA RESP QL NAA+PROBE: NOT DETECTED
GFR SERPL CREATININE-BSD FRML MDRD: > 90 ML/MIN/1.73M2
GLUCOSE SERPL-MCNC: 86 MG/DL (ref 74–109)
GLUCOSE UR QL STRIP.AUTO: NEGATIVE MG/DL
HCT VFR BLD AUTO: 42 % (ref 37–47)
HGB BLD-MCNC: 14.2 GM/DL (ref 12–16)
HGB UR QL STRIP.AUTO: ABNORMAL
IMM GRANULOCYTES # BLD AUTO: 0.04 THOU/MM3 (ref 0–0.07)
IMM GRANULOCYTES NFR BLD AUTO: 0.4 %
KETONES UR QL STRIP.AUTO: 15
LIPASE SERPL-CCNC: 16 U/L (ref 13–60)
LYMPHOCYTES ABSOLUTE: 1.2 THOU/MM3 (ref 1–4.8)
LYMPHOCYTES NFR BLD AUTO: 10.1 %
MCH RBC QN AUTO: 31.1 PG (ref 26–33)
MCHC RBC AUTO-ENTMCNC: 33.8 GM/DL (ref 32.2–35.5)
MCV RBC AUTO: 91.9 FL (ref 81–99)
MISCELLANEOUS 2: ABNORMAL
MONOCYTES ABSOLUTE: 0.7 THOU/MM3 (ref 0.4–1.3)
MONOCYTES NFR BLD AUTO: 6.2 %
NEUTROPHILS ABSOLUTE: 9.4 THOU/MM3 (ref 1.8–7.7)
NEUTROPHILS NFR BLD AUTO: 82.8 %
NITRITE UR QL STRIP: NEGATIVE
NRBC BLD AUTO-RTO: 0 /100 WBC
OSMOLALITY SERPL CALC.SUM OF ELEC: 278.8 MOSMOL/KG (ref 275–300)
PH UR STRIP.AUTO: 7 [PH] (ref 5–9)
PLATELET # BLD AUTO: 157 THOU/MM3 (ref 130–400)
PMV BLD AUTO: 12.7 FL (ref 9.4–12.4)
POTASSIUM SERPL-SCNC: 3.8 MEQ/L (ref 3.5–5.2)
PROT SERPL-MCNC: 7.1 G/DL (ref 6.4–8.3)
PROT UR STRIP.AUTO-MCNC: NEGATIVE MG/DL
RBC # BLD AUTO: 4.57 MILL/MM3 (ref 4.2–5.4)
RBC URINE: ABNORMAL /HPF
RENAL EPI CELLS #/AREA URNS HPF: ABNORMAL /[HPF]
SARS-COV-2 RNA RESP QL NAA+PROBE: NOT DETECTED
SODIUM SERPL-SCNC: 140 MEQ/L (ref 135–145)
SP GR UR REFRACT.AUTO: 1.02 (ref 1–1.03)
UROBILINOGEN, URINE: 1 EU/DL (ref 0–1)
WBC # BLD AUTO: 11.4 THOU/MM3 (ref 4.8–10.8)
WBC #/AREA URNS HPF: ABNORMAL /HPF
WBC #/AREA URNS HPF: NEGATIVE /[HPF]
YEAST LIKE FUNGI URNS QL MICRO: ABNORMAL

## 2025-04-27 PROCEDURE — 93010 ELECTROCARDIOGRAM REPORT: CPT | Performed by: INTERNAL MEDICINE

## 2025-04-27 PROCEDURE — 83690 ASSAY OF LIPASE: CPT

## 2025-04-27 PROCEDURE — 6360000002 HC RX W HCPCS: Performed by: EMERGENCY MEDICINE

## 2025-04-27 PROCEDURE — 96375 TX/PRO/DX INJ NEW DRUG ADDON: CPT

## 2025-04-27 PROCEDURE — 85025 COMPLETE CBC W/AUTO DIFF WBC: CPT

## 2025-04-27 PROCEDURE — 96374 THER/PROPH/DIAG INJ IV PUSH: CPT

## 2025-04-27 PROCEDURE — 96376 TX/PRO/DX INJ SAME DRUG ADON: CPT

## 2025-04-27 PROCEDURE — 82077 ASSAY SPEC XCP UR&BREATH IA: CPT

## 2025-04-27 PROCEDURE — 81001 URINALYSIS AUTO W/SCOPE: CPT

## 2025-04-27 PROCEDURE — 99284 EMERGENCY DEPT VISIT MOD MDM: CPT

## 2025-04-27 PROCEDURE — 87636 SARSCOV2 & INF A&B AMP PRB: CPT

## 2025-04-27 PROCEDURE — 82248 BILIRUBIN DIRECT: CPT

## 2025-04-27 PROCEDURE — 84703 CHORIONIC GONADOTROPIN ASSAY: CPT

## 2025-04-27 PROCEDURE — 93005 ELECTROCARDIOGRAM TRACING: CPT | Performed by: EMERGENCY MEDICINE

## 2025-04-27 PROCEDURE — 36415 COLL VENOUS BLD VENIPUNCTURE: CPT

## 2025-04-27 PROCEDURE — 80307 DRUG TEST PRSMV CHEM ANLYZR: CPT

## 2025-04-27 PROCEDURE — 80053 COMPREHEN METABOLIC PANEL: CPT

## 2025-04-27 PROCEDURE — 2580000003 HC RX 258: Performed by: EMERGENCY MEDICINE

## 2025-04-27 RX ORDER — ONDANSETRON 4 MG/1
4 TABLET, ORALLY DISINTEGRATING ORAL 3 TIMES DAILY PRN
Qty: 21 TABLET | Refills: 0 | Status: SHIPPED | OUTPATIENT
Start: 2025-04-27

## 2025-04-27 RX ORDER — DROPERIDOL 2.5 MG/ML
0.62 INJECTION, SOLUTION INTRAMUSCULAR; INTRAVENOUS ONCE
Status: COMPLETED | OUTPATIENT
Start: 2025-04-27 | End: 2025-04-27

## 2025-04-27 RX ORDER — ONDANSETRON 2 MG/ML
4 INJECTION INTRAMUSCULAR; INTRAVENOUS ONCE
Status: DISCONTINUED | OUTPATIENT
Start: 2025-04-27 | End: 2025-04-27

## 2025-04-27 RX ORDER — 0.9 % SODIUM CHLORIDE 0.9 %
1000 INTRAVENOUS SOLUTION INTRAVENOUS ONCE
Status: COMPLETED | OUTPATIENT
Start: 2025-04-27 | End: 2025-04-27

## 2025-04-27 RX ADMIN — DROPERIDOL 0.62 MG: 2.5 INJECTION, SOLUTION INTRAMUSCULAR; INTRAVENOUS at 18:23

## 2025-04-27 RX ADMIN — SODIUM CHLORIDE 1000 ML: 0.9 INJECTION, SOLUTION INTRAVENOUS at 16:58

## 2025-04-27 RX ADMIN — FAMOTIDINE 20 MG: 10 INJECTION, SOLUTION INTRAVENOUS at 17:00

## 2025-04-27 RX ADMIN — DROPERIDOL 0.62 MG: 2.5 INJECTION, SOLUTION INTRAMUSCULAR; INTRAVENOUS at 17:17

## 2025-04-27 ASSESSMENT — PAIN - FUNCTIONAL ASSESSMENT: PAIN_FUNCTIONAL_ASSESSMENT: NONE - DENIES PAIN

## 2025-04-27 NOTE — ED PROVIDER NOTES
[facility]  EMERGENCY DEPARTMENT ENCOUNTER          Pt Name: Destiney Macario  MRN: [id number]  Birthdate 2000  Date of evaluation: 4/27/2025    CHIEF COMPLAINT     No chief complaint on file.      Nurses Notes reviewed and I agree except as noted in the HPI.    HISTORY OF PRESENT ILLNESS    Destiney Macario is a 24 y.o. pleasant female who presents to the emergency department for evaluation of nausea, vomiting.  Patient reports that she has been vomiting since around 2:00 this morning.  She is unable to keep anything down by mouth, even water was coming back up.  Emesis is consisted of what she tried to take by mouth or has been yellowish.  She denies black or bloody emesis.  She denies fever, diarrhea, dysuria, hematuria.  She denies abdominal pain.  She does report ongoing nausea.  She also reports feeling lightheaded and became concerned that she was dehydrated so came to the emergency department.  She denies recent ill contacts, travel, antibiotic use, new or strange foods.  She does report that she had a couple of alcoholic drinks last night but has had this amount of alcohol in the past and not dealt with any nausea or vomiting afterward.  Mother is present and was with her last night while they were out, states there is no concern that she was slipped anything in her drink either.  The patient has no other acute complaints at this time.        REVIEW OF SYSTEMS   Review of Systems    PAST MEDICAL AND SURGICAL HISTORY     Past Medical History:   Diagnosis Date    Anxiety     Irritable bowel syndrome      Past Surgical History:   Procedure Laterality Date    COLONOSCOPY      SEPTOPLASTY N/A 8/4/2023    Septoplasty Bilateral Inferior Turbinate Resection Left Nasal Valve Collapse Repair including Lateral Wall Implant from Septal Cartilage performed by John Mace MD at [facility] OR    WISDOM TOOTH EXTRACTION         CURRENT MEDICATIONS   No current facility-administered

## 2025-04-27 NOTE — ED NOTES
Pt arrives to ED with vomiting since 2 am this morning and can't keep anything down. States she was drinking last night but she doesn't think that's it. Respirations easy and unlabored. Covid/flu swab collected at this time. Denies any other needs at this time.

## 2025-04-27 NOTE — DISCHARGE INSTR - COC
Continuity of Care Form    Patient Name: Destiney Macario   :  2000  MRN:  [id number]    Admit date:  2025  Discharge date:  ***    Code Status Order: Prior   Advance Directives:     Admitting Physician:  No admitting provider for patient encounter.  PCP: Nicky Ceron, CHEYENNE - CNP    Discharging Nurse: ***  Discharging Hospital Unit/Room#: 29029A  Discharging Unit Phone Number: ***    Emergency Contact:   Extended Emergency Contact Information  Primary Emergency Contact: Joyce Gar  Address: [address] [place] [address] [facility]  Home Phone: [phone]  Mobile Phone: [phone]  Relation: Parent  Secondary Emergency Contact: MAHI ZHANG  Home Phone: [phone]  Relation: Grandparent   needed? No    Past Surgical History:  Past Surgical History:   Procedure Laterality Date    COLONOSCOPY      SEPTOPLASTY N/A 2023    Septoplasty Bilateral Inferior Turbinate Resection Left Nasal Valve Collapse Repair including Lateral Wall Implant from Septal Cartilage performed by John Mace MD at [facility] OR    WISDOM TOOTH EXTRACTION         Immunization History:     There is no immunization history on file for this patient.    Active Problems:  Patient Active Problem List   Diagnosis Code    Generalized abdominal pain R10.84    Leukocytosis D72.829    Nasal obstruction J34.89    Nasal septal deviation J34.2    Hypertrophy of inferior nasal turbinate J34.3    Nasal valve collapse J34.829    Status post nasal septoplasty Z98.890    H/O nasal septoplasty Z98.890    Nonintractable episodic headache R51.9    Irritable bowel syndrome with both constipation and diarrhea K58.2    Anxiety F41.9       Isolation/Infection:   Isolation            No Isolation          Patient Infection Status    None to display              Nurse Assessment:  Last Vital Signs: /69   Pulse 85   Temp 97.8 °F (36.6 °C)   Resp 16   Ht 1.626 m (5' 4\")   Wt 65.8 kg (145 lb)

## 2025-04-28 LAB
AMPHETAMINES UR QL SCN: NEGATIVE
BARBITURATES UR QL SCN: NEGATIVE
BENZODIAZ UR QL SCN: NEGATIVE
BZE UR QL SCN: NEGATIVE
CANNABINOIDS UR QL SCN: NEGATIVE
FENTANYL: NEGATIVE
OPIATES UR QL SCN: NEGATIVE
OXYCODONE: NEGATIVE
PCP UR QL SCN: NEGATIVE

## 2025-04-30 ENCOUNTER — OFFICE VISIT (OUTPATIENT)
Dept: FAMILY MEDICINE CLINIC | Age: 25
End: 2025-04-30
Payer: COMMERCIAL

## 2025-04-30 VITALS
WEIGHT: 145 LBS | HEART RATE: 80 BPM | RESPIRATION RATE: 16 BRPM | DIASTOLIC BLOOD PRESSURE: 70 MMHG | SYSTOLIC BLOOD PRESSURE: 100 MMHG | BODY MASS INDEX: 24.89 KG/M2 | TEMPERATURE: 98 F

## 2025-04-30 DIAGNOSIS — R19.7 DIARRHEA, UNSPECIFIED TYPE: Primary | ICD-10-CM

## 2025-04-30 DIAGNOSIS — K92.1 HEMATOCHEZIA: ICD-10-CM

## 2025-04-30 DIAGNOSIS — R11.2 NAUSEA AND VOMITING, UNSPECIFIED VOMITING TYPE: ICD-10-CM

## 2025-04-30 PROCEDURE — 99213 OFFICE O/P EST LOW 20 MIN: CPT | Performed by: NURSE PRACTITIONER

## 2025-04-30 ASSESSMENT — ENCOUNTER SYMPTOMS
BLOOD IN STOOL: 1
SHORTNESS OF BREATH: 0
CONSTIPATION: 0
DIARRHEA: 1
NAUSEA: 1
VOMITING: 0

## 2025-04-30 NOTE — PROGRESS NOTES
Chief Complaint   Patient presents with    Follow-up     ED f/u. Diarrhea and blood in stool          SUBJECTIVE     Destiney Macario is a 24 y.o.female      History of Present Illness  The patient is a 24-year-old female who presents for an emergency room follow-up.    Patient presents for ED follow-up.  Was evaluated due to vomiting and unable to keep anything down.  Patient states she was also evaluated in the emergency department for similar symptoms about 10 days prior.  She is feeling slightly better today.  She reports experiencing diarrhea today, which she suspects may have been bloody based on the color of her stool. Mild abdominal discomfort is noted but no severe pain. Zofran has not been taken since Monday and was not required yesterday. Food intake has been tolerated. Hydration has been maintained with regular water intake.   She does note some dizziness as well.  States her vision wants to close it and she sees little spots in her vision.  She does attribute this to not feeling well.    Patient has had GI issues in the past and was evaluated by GI and subsequently had a colonoscopy in 2021.  She states this was normal.    Depo-Provera was discontinued in November 2024, after a 7-year use due to menstrual irregularities and associated illness. Light bleeding has been experienced since Easter, approximately 10 days ago.        Review of Systems   Constitutional:  Positive for fatigue. Negative for chills, diaphoresis and fever.   Respiratory:  Negative for shortness of breath.    Cardiovascular:  Negative for chest pain, palpitations and leg swelling.   Gastrointestinal:  Positive for blood in stool, diarrhea and nausea (improved). Negative for constipation and vomiting.   Genitourinary:  Positive for vaginal bleeding. Negative for dysuria and hematuria.   Musculoskeletal:  Negative for myalgias.   Neurological:  Positive for dizziness and weakness. Negative for headaches.   All other systems reviewed

## 2025-05-22 ENCOUNTER — TRANSCRIBE ORDERS (OUTPATIENT)
Dept: ADMINISTRATIVE | Age: 25
End: 2025-05-22

## 2025-05-22 DIAGNOSIS — R11.0 NAUSEA: Primary | ICD-10-CM

## 2025-05-27 ENCOUNTER — HOSPITAL ENCOUNTER (OUTPATIENT)
Dept: NUCLEAR MEDICINE | Age: 25
Discharge: HOME | End: 2025-05-27
Payer: COMMERCIAL

## 2025-05-27 DIAGNOSIS — R11.0 NAUSEA: ICD-10-CM

## 2025-05-27 PROCEDURE — 3430000000 HC RX DIAGNOSTIC RADIOPHARMACEUTICAL

## 2025-05-27 PROCEDURE — A9541 TC99M SULFUR COLLOID: HCPCS

## 2025-05-27 PROCEDURE — 78264 GASTRIC EMPTYING IMG STUDY: CPT

## 2025-05-27 RX ADMIN — Medication 1 MILLICURIE: at 07:15

## 2025-06-23 ENCOUNTER — PATIENT MESSAGE (OUTPATIENT)
Dept: FAMILY MEDICINE CLINIC | Age: 25
End: 2025-06-23

## 2025-06-23 RX ORDER — ONDANSETRON 4 MG/1
4 TABLET, ORALLY DISINTEGRATING ORAL 3 TIMES DAILY PRN
Qty: 21 TABLET | Refills: 0 | Status: SHIPPED | OUTPATIENT
Start: 2025-06-23

## 2025-06-23 NOTE — TELEPHONE ENCOUNTER
This medication refill is regarding a MyChart request. Refill requested by patient.    Requested Prescriptions     Pending Prescriptions Disp Refills    ondansetron (ZOFRAN-ODT) 4 MG disintegrating tablet 21 tablet 0     Sig: Take 1 tablet by mouth 3 times daily as needed for Nausea or Vomiting     Date of last visit: 4/30/2025   Date of next visit: None  Date of last refill: 4/27/25 #21/0  Pharmacy Name: [facility] [address]    Rx verified, ordered and set to EP.

## (undated) DEVICE — SUTURE PROL SZ 2-0 L18IN NONABSORBABLE BLU FS L26MM 3/8 CIR 8685H

## (undated) DEVICE — GAUZE,SPONGE,8"X4",12PLY,XRAY,STRL,LF: Brand: MEDLINE

## (undated) DEVICE — SYRINGE IRRIG 60ML SFT PLIABLE BLB EZ TO GRP 1 HND USE W/

## (undated) DEVICE — SPLINT 1524050 5PK PAIR DOYLE II AIRWAY: Brand: DOYLE II ™

## (undated) DEVICE — GLOVE SURG 7.5 PF POLYMER WHT STRL SIGN LTX ESSENTIAL LTX

## (undated) DEVICE — ENTACT SEPTAL STAPLER 3 PACK: Brand: ENT SINUS

## (undated) DEVICE — GLOVE SURG SZ 7.5 L11.73IN FNGR THK9.8MIL STRW LTX POLYMER

## (undated) DEVICE — COAGULATOR SUCT 8FR L6IN HNDL FOR ENT PROC

## (undated) DEVICE — ENT: Brand: MEDLINE INDUSTRIES, INC.

## (undated) DEVICE — SUTURE MCRYL SZ 4 0 L18IN ABSRB UD P 3 3 8 CIR REV CUT NDL MCP494G

## (undated) DEVICE — GLOVE BIOGEL S S PI 7 1/2

## (undated) DEVICE — APPLICATOR MEDICATED 10.5 CC SOLUTION CLR STRL CHLORAPREP